# Patient Record
Sex: MALE | Race: WHITE | Employment: OTHER | URBAN - NONMETROPOLITAN AREA
[De-identification: names, ages, dates, MRNs, and addresses within clinical notes are randomized per-mention and may not be internally consistent; named-entity substitution may affect disease eponyms.]

---

## 2017-05-21 ENCOUNTER — APPOINTMENT (OUTPATIENT)
Dept: GENERAL RADIOLOGY | Age: 57
End: 2017-05-21
Payer: OTHER GOVERNMENT

## 2017-05-21 ENCOUNTER — HOSPITAL ENCOUNTER (EMERGENCY)
Age: 57
Discharge: HOME | End: 2017-05-21
Payer: OTHER GOVERNMENT

## 2017-05-21 VITALS
WEIGHT: 200 LBS | RESPIRATION RATE: 16 BRPM | BODY MASS INDEX: 28.63 KG/M2 | OXYGEN SATURATION: 98 % | HEIGHT: 70 IN | TEMPERATURE: 98.4 F | DIASTOLIC BLOOD PRESSURE: 85 MMHG | SYSTOLIC BLOOD PRESSURE: 155 MMHG | HEART RATE: 82 BPM

## 2017-05-21 DIAGNOSIS — S90.31XA CONTUSION OF RIGHT FOOT, INITIAL ENCOUNTER: Primary | ICD-10-CM

## 2017-05-21 PROCEDURE — 99282 EMERGENCY DEPT VISIT SF MDM: CPT | Performed by: NURSE PRACTITIONER

## 2017-05-21 PROCEDURE — 73630 X-RAY EXAM OF FOOT: CPT

## 2017-05-21 PROCEDURE — 99283 EMERGENCY DEPT VISIT LOW MDM: CPT

## 2017-05-21 RX ORDER — ACETAMINOPHEN, ASPIRIN AND CAFFEINE 250; 250; 65 MG/1; MG/1; MG/1
1 TABLET, FILM COATED ORAL EVERY 6 HOURS PRN
COMMUNITY
End: 2019-09-20 | Stop reason: CLARIF

## 2017-05-21 RX ORDER — OMEPRAZOLE 20 MG/1
20 CAPSULE, DELAYED RELEASE ORAL DAILY
COMMUNITY

## 2017-05-21 RX ORDER — HYDROCODONE BITARTRATE AND ACETAMINOPHEN 5; 325 MG/1; MG/1
1 TABLET ORAL EVERY 6 HOURS PRN
Qty: 10 TABLET | Refills: 0 | Status: SHIPPED | OUTPATIENT
Start: 2017-05-21 | End: 2018-09-17

## 2017-05-21 ASSESSMENT — PAIN DESCRIPTION - DESCRIPTORS: DESCRIPTORS: ACHING

## 2017-05-21 ASSESSMENT — PAIN SCALES - GENERAL: PAINLEVEL_OUTOF10: 2

## 2017-05-21 ASSESSMENT — PAIN DESCRIPTION - PAIN TYPE: TYPE: ACUTE PAIN

## 2017-05-21 ASSESSMENT — PAIN DESCRIPTION - ORIENTATION: ORIENTATION: RIGHT

## 2017-05-21 ASSESSMENT — PAIN DESCRIPTION - LOCATION: LOCATION: FOOT

## 2018-09-17 ENCOUNTER — OFFICE VISIT (OUTPATIENT)
Dept: FAMILY MEDICINE CLINIC | Age: 58
End: 2018-09-17
Payer: OTHER GOVERNMENT

## 2018-09-17 VITALS
OXYGEN SATURATION: 96 % | DIASTOLIC BLOOD PRESSURE: 68 MMHG | SYSTOLIC BLOOD PRESSURE: 126 MMHG | HEART RATE: 62 BPM | HEIGHT: 70 IN | TEMPERATURE: 97.8 F | BODY MASS INDEX: 29.78 KG/M2 | WEIGHT: 208 LBS

## 2018-09-17 DIAGNOSIS — Z12.5 ENCOUNTER FOR PROSTATE CANCER SCREENING: ICD-10-CM

## 2018-09-17 DIAGNOSIS — K21.9 GASTROESOPHAGEAL REFLUX DISEASE WITHOUT ESOPHAGITIS: ICD-10-CM

## 2018-09-17 DIAGNOSIS — R53.83 OTHER FATIGUE: ICD-10-CM

## 2018-09-17 DIAGNOSIS — Z13.220 LIPID SCREENING: ICD-10-CM

## 2018-09-17 DIAGNOSIS — Z00.00 ANNUAL PHYSICAL EXAM: Primary | ICD-10-CM

## 2018-09-17 DIAGNOSIS — Z12.11 ENCOUNTER FOR SCREENING COLONOSCOPY: ICD-10-CM

## 2018-09-17 PROCEDURE — 99386 PREV VISIT NEW AGE 40-64: CPT | Performed by: FAMILY MEDICINE

## 2018-09-17 NOTE — PATIENT INSTRUCTIONS
What Everyone Should Know about Shingles Vaccine (Shingrix)    CONTACT YOUR INSURANCE TO SEE IF YOUR POLICY COVERS THIS VACCINE    One of the Recommended Vaccines  Shingles vaccination is the only way to protect against shingles and postherpetic neuralgia (PHN), the most common complication from shingles. CDC recommends that healthy adults 50 years and older get two doses of the shingles vaccine called Shingrix®,  by 2 to 6 months, to prevent shingles and the complications from the disease. Your doctor or pharmacist can give you Shingrix as a shot in your upper arm. Shingrix provides strong protection against shingles and PHN. Two doses of Shingrix is more than 90% effective at preventing shingles and PHN. Protection stays above 85% for at least the first four years after you get vaccinated. Shingrix is the preferred vaccine, over Zostavax®, a shingles vaccine in use since 2006. Who Should Get Shingrix? Healthy adults 50 years and older should get two doses of Shingrix,  by 2 to 6 months. You should get Shingrix even if in the past you   had shingles   received Zostavax   are not sure if you had chickenpox  Vaccine for Those 50 Years and Older  Shingrix reduces the risk of shingles and PHN by more than 90% in people 48 and older. CDC recommends the vaccine for healthy adults 48 and older. There is no maximum age for getting Shingrix. If you had shingles in the past, you can get Shingrix to help prevent future occurrences of the disease. There is no specific length of time that you need to wait after having shingles before you can receive Shingrix, but generally you should make sure the shingles rash has gone away before getting vaccinated. You can get Shingrix whether or not you remember having had chickenpox in the past. Studies show that more than 99% of Americans 40 years and older have had chickenpox, even if they dont remember having the disease.  Chickenpox and shingles are related because they are caused by the same virus (varicella zoster virus). After a person recovers from chickenpox, the virus stays dormant (inactive) in the body. It can reactivate years later and cause shingles. If you had Zostavax in the recent past, you should wait at least eight weeks before getting Shingrix. Talk to your healthcare provider to determine the best time to get Shingrix. Shingrix is available in [place] and pharmacies. If you have questions about Shingrix, talk with your healthcare provider. Who Should Not Get Shingrix? The side effects of the Shingrix are temporary, and usually last 2 to 3 days. While you may experience pain for a few days after getting Shingrix, the pain will be less severe than having shingles and the complications from the disease. You should not get Shingrix if you:   have ever had a severe allergic reaction to any component of the vaccine or after a dose of Shingrix   tested negative for immunity to varicella zoster virus. If you test negative, you should get chickenpox vaccine.  currently have shingles   currently are pregnant or breastfeeding. Women who are pregnant or breastfeeding should wait to get Shingrix. If you have a minor acute (starts suddenly) illness, such as a cold, you may get Shingrix. But if you have a moderate or severe acute illness, you should usually wait until you recover before getting the vaccine. This includes anyone with a temperature of 101.3°F or higher. How Well Does Shingrix Work? Two doses of Shingrix provides strong protection against shingles and postherpetic neuralgia (PHN), the most common complication of shingles.  In adults 48to 71years old who got two doses, Shingrix was 97% effective in preventing shingles; among adults 70 years and older, Shingrix was 91% effective.    In adults 48to 71years old who got two doses, Shingrix was 91% effective in preventing PHN; among adults 70 years and older, Shingrix was 89% effective. Shingrix protection remained high (more than 85%) in people 70 years and older throughout the four years following vaccination. Since your risk of shingles and PHN increases as you get older, it is important to have strong protection against shingles in your older years. What are the possible side effects of Shingrix? Studies show that Shingrix is safe. The vaccine helps your body create a strong defense against shingles. As a result, you are likely to have temporary side effects from getting the shots. The side effects may affect your ability to do normal daily activities for 2 to 3 days. Most people got a sore arm with mild or moderate pain after getting Shingrix, and some also had redness and swelling where they got the shot. Some people felt tired, had muscle pain, a headache, shivering, fever, stomach pain, or nausea. About 1 out of 6 people who got Shingrix experienced side effects that prevented them from doing regular activities. Symptoms went away on their own in about 2 to 3 days. Side effects were more common in younger people. You might have a reaction to the first or second dose of Shingrix, or both doses. If you experience side effects, you may choose to take over-the-counter pain medicine such as ibuprofen or acetaminophen. Severe allergic reactions to any vaccine are very rare. Signs of a severe allergic reaction can include hives, swelling of the face and throat, difficulty breathing, a fast heartbeat, dizziness, and weakness. These would start a few minutes to a few hours after the vaccination. If you have a severe allergic reaction or other emergency that cant wait, call 9-1-1 or go to the nearest hospital. Otherwise, call your doctor. If you experience side effects from Shingrix, you should report them to the Vaccine Adverse Event Reporting System (VAERS).  Your doctor might file this report, or you can do it yourself through the VAERS website, or by calling

## 2018-09-19 DIAGNOSIS — K21.9 GASTROESOPHAGEAL REFLUX DISEASE WITHOUT ESOPHAGITIS: ICD-10-CM

## 2018-09-19 DIAGNOSIS — Z13.220 LIPID SCREENING: ICD-10-CM

## 2018-09-19 DIAGNOSIS — R53.83 OTHER FATIGUE: ICD-10-CM

## 2018-09-19 DIAGNOSIS — Z12.5 ENCOUNTER FOR PROSTATE CANCER SCREENING: ICD-10-CM

## 2018-09-19 LAB
ALBUMIN SERPL-MCNC: 4.3 G/DL (ref 3.5–5.2)
ALP BLD-CCNC: 47 U/L (ref 40–130)
ALT SERPL-CCNC: 42 U/L (ref 5–41)
ANION GAP SERPL CALCULATED.3IONS-SCNC: 12 MMOL/L (ref 7–19)
AST SERPL-CCNC: 30 U/L (ref 5–40)
BASOPHILS ABSOLUTE: 0 K/UL (ref 0–0.2)
BASOPHILS RELATIVE PERCENT: 0.4 % (ref 0–1)
BILIRUB SERPL-MCNC: 0.5 MG/DL (ref 0.2–1.2)
BUN BLDV-MCNC: 21 MG/DL (ref 6–20)
CALCIUM SERPL-MCNC: 9.3 MG/DL (ref 8.6–10)
CHLORIDE BLD-SCNC: 103 MMOL/L (ref 98–111)
CHOLESTEROL, TOTAL: 238 MG/DL (ref 160–199)
CO2: 27 MMOL/L (ref 22–29)
CREAT SERPL-MCNC: 1 MG/DL (ref 0.5–1.2)
EOSINOPHILS ABSOLUTE: 0.1 K/UL (ref 0–0.6)
EOSINOPHILS RELATIVE PERCENT: 1.2 % (ref 0–5)
GFR NON-AFRICAN AMERICAN: >60
GLUCOSE BLD-MCNC: 105 MG/DL (ref 74–109)
HCT VFR BLD CALC: 45 % (ref 42–52)
HDLC SERPL-MCNC: 37 MG/DL (ref 55–121)
HEMOGLOBIN: 14.8 G/DL (ref 14–18)
LDL CHOLESTEROL CALCULATED: 170 MG/DL
LYMPHOCYTES ABSOLUTE: 2.1 K/UL (ref 1.1–4.5)
LYMPHOCYTES RELATIVE PERCENT: 43.9 % (ref 20–40)
MCH RBC QN AUTO: 29.5 PG (ref 27–31)
MCHC RBC AUTO-ENTMCNC: 32.9 G/DL (ref 33–37)
MCV RBC AUTO: 89.8 FL (ref 80–94)
MONOCYTES ABSOLUTE: 0.4 K/UL (ref 0–0.9)
MONOCYTES RELATIVE PERCENT: 7.6 % (ref 0–10)
NEUTROPHILS ABSOLUTE: 2.3 K/UL (ref 1.5–7.5)
NEUTROPHILS RELATIVE PERCENT: 46.7 % (ref 50–65)
PDW BLD-RTO: 12.4 % (ref 11.5–14.5)
PLATELET # BLD: 204 K/UL (ref 130–400)
PMV BLD AUTO: 10.7 FL (ref 9.4–12.4)
POTASSIUM SERPL-SCNC: 4.3 MMOL/L (ref 3.5–5)
PROSTATE SPECIFIC ANTIGEN: 0.47 NG/ML (ref 0–4)
RBC # BLD: 5.01 M/UL (ref 4.7–6.1)
SODIUM BLD-SCNC: 142 MMOL/L (ref 136–145)
T4 FREE: 1.1 NG/DL (ref 0.9–1.7)
TESTOSTERONE TOTAL: 582 NG/DL (ref 193–740)
TOTAL PROTEIN: 7.2 G/DL (ref 6.6–8.7)
TRIGL SERPL-MCNC: 155 MG/DL (ref 0–149)
TSH SERPL DL<=0.05 MIU/L-ACNC: 2.25 UIU/ML (ref 0.27–4.2)
WBC # BLD: 4.9 K/UL (ref 4.8–10.8)

## 2018-10-05 ENCOUNTER — HOSPITAL ENCOUNTER (OUTPATIENT)
Dept: GENERAL RADIOLOGY | Age: 58
Discharge: HOME | End: 2018-10-05
Payer: OTHER GOVERNMENT

## 2018-10-05 ENCOUNTER — OFFICE VISIT (OUTPATIENT)
Dept: FAMILY MEDICINE CLINIC | Age: 58
End: 2018-10-05
Payer: OTHER GOVERNMENT

## 2018-10-05 VITALS
HEART RATE: 92 BPM | BODY MASS INDEX: 29.66 KG/M2 | SYSTOLIC BLOOD PRESSURE: 148 MMHG | DIASTOLIC BLOOD PRESSURE: 82 MMHG | TEMPERATURE: 97.3 F | OXYGEN SATURATION: 96 % | HEIGHT: 70 IN | WEIGHT: 207.2 LBS

## 2018-10-05 DIAGNOSIS — M25.562 ACUTE PAIN OF LEFT KNEE: Primary | ICD-10-CM

## 2018-10-05 DIAGNOSIS — M25.562 ACUTE PAIN OF LEFT KNEE: ICD-10-CM

## 2018-10-05 PROCEDURE — 99213 OFFICE O/P EST LOW 20 MIN: CPT | Performed by: NURSE PRACTITIONER

## 2018-10-05 PROCEDURE — 73560 X-RAY EXAM OF KNEE 1 OR 2: CPT

## 2018-10-05 RX ORDER — NAPROXEN 500 MG/1
500 TABLET ORAL 2 TIMES DAILY PRN
Qty: 30 TABLET | Refills: 0 | Status: SHIPPED | OUTPATIENT
Start: 2018-10-05

## 2018-10-05 ASSESSMENT — ENCOUNTER SYMPTOMS
CHEST TIGHTNESS: 0
NAUSEA: 0
DIARRHEA: 0
SHORTNESS OF BREATH: 0
WHEEZING: 0
COUGH: 0
ABDOMINAL PAIN: 0
SORE THROAT: 0

## 2018-10-05 ASSESSMENT — PATIENT HEALTH QUESTIONNAIRE - PHQ9
SUM OF ALL RESPONSES TO PHQ QUESTIONS 1-9: 0
SUM OF ALL RESPONSES TO PHQ9 QUESTIONS 1 & 2: 0
1. LITTLE INTEREST OR PLEASURE IN DOING THINGS: 0
SUM OF ALL RESPONSES TO PHQ QUESTIONS 1-9: 0
2. FEELING DOWN, DEPRESSED OR HOPELESS: 0

## 2018-10-05 NOTE — PROGRESS NOTES
Sarina Tejada is a 61 y.o. male who presents today for   Chief Complaint   Patient presents with    Knee Pain     left knee-pt c/o pain lasting for about a week        HPI:  He has had L knee pain for the past week. He thinks he may have run into the bedpost during the night but had no significant pain initially. No injury otherwise. He played basketball twice this week which exacerbated the pain. Pain is mainly patellar, particularly subpatellar. He has had mild swelling. Pain is worse when bearing weight, walking. He is using crutches today. He was wearing a brace but seemed to make it worse. He is taking excedrin for pain. He tried an old hydrocodone he had from a previous rx but caused nausea. Pain is slightly better today. Review of Systems   Constitutional: Negative for chills and fever. HENT: Negative for congestion, ear pain and sore throat. Respiratory: Negative for cough, chest tightness, shortness of breath and wheezing. Cardiovascular: Negative for chest pain. Gastrointestinal: Negative for abdominal pain, diarrhea and nausea. Musculoskeletal: Positive for arthralgias (left knee pain). Negative for myalgias. Skin: Negative for rash. Past Medical History:   Diagnosis Date    Head injury 1975    Headache        Current Outpatient Prescriptions   Medication Sig Dispense Refill    naproxen (NAPROSYN) 500 MG tablet Take 1 tablet by mouth 2 times daily as needed for Pain Take with food. 30 tablet 0    Multiple Vitamin (MULTI-VITAMIN DAILY PO) Take by mouth      aspirin-acetaminophen-caffeine (EXCEDRIN MIGRAINE) 250-250-65 MG per tablet Take 1 tablet by mouth every 6 hours as needed for Headaches      omeprazole (PRILOSEC) 20 MG delayed release capsule Take 20 mg by mouth daily       No current facility-administered medications for this visit.         Allergies   Allergen Reactions    Hydrocodone Nausea Only       Past Surgical History:   Procedure Laterality Date

## 2019-09-20 ENCOUNTER — OFFICE VISIT (OUTPATIENT)
Dept: FAMILY MEDICINE CLINIC | Age: 59
End: 2019-09-20
Payer: OTHER GOVERNMENT

## 2019-09-20 VITALS
WEIGHT: 208 LBS | DIASTOLIC BLOOD PRESSURE: 78 MMHG | BODY MASS INDEX: 29.84 KG/M2 | TEMPERATURE: 97.8 F | SYSTOLIC BLOOD PRESSURE: 132 MMHG | HEART RATE: 54 BPM | OXYGEN SATURATION: 98 %

## 2019-09-20 DIAGNOSIS — R06.02 SHORTNESS OF BREATH: ICD-10-CM

## 2019-09-20 DIAGNOSIS — Z12.5 ENCOUNTER FOR PROSTATE CANCER SCREENING: ICD-10-CM

## 2019-09-20 DIAGNOSIS — R94.31 ABNORMAL EKG: ICD-10-CM

## 2019-09-20 DIAGNOSIS — I45.10 INCOMPLETE RBBB: ICD-10-CM

## 2019-09-20 DIAGNOSIS — Z13.220 LIPID SCREENING: ICD-10-CM

## 2019-09-20 DIAGNOSIS — Z23 NEED FOR INFLUENZA VACCINATION: ICD-10-CM

## 2019-09-20 DIAGNOSIS — R53.83 OTHER FATIGUE: ICD-10-CM

## 2019-09-20 DIAGNOSIS — Z00.00 ANNUAL PHYSICAL EXAM: Primary | ICD-10-CM

## 2019-09-20 PROCEDURE — 90686 IIV4 VACC NO PRSV 0.5 ML IM: CPT | Performed by: FAMILY MEDICINE

## 2019-09-20 PROCEDURE — 90471 IMMUNIZATION ADMIN: CPT | Performed by: FAMILY MEDICINE

## 2019-09-20 PROCEDURE — 99386 PREV VISIT NEW AGE 40-64: CPT | Performed by: FAMILY MEDICINE

## 2019-09-20 PROCEDURE — 93000 ELECTROCARDIOGRAM COMPLETE: CPT | Performed by: FAMILY MEDICINE

## 2019-09-20 RX ORDER — MULTIVITAMIN WITH IRON
250 TABLET ORAL DAILY
COMMUNITY

## 2019-09-20 RX ORDER — ASCORBIC ACID 1000 MG
TABLET ORAL
COMMUNITY

## 2019-09-20 RX ORDER — CHOLECALCIFEROL (VITAMIN D3) 25 MCG
TABLET ORAL
COMMUNITY

## 2019-09-20 RX ORDER — LANOLIN ALCOHOL/MO/W.PET/CERES
1000 CREAM (GRAM) TOPICAL DAILY
COMMUNITY

## 2019-09-20 RX ORDER — ACETAMINOPHEN, ASPIRIN AND CAFFEINE 250; 250; 65 MG/1; MG/1; MG/1
TABLET, FILM COATED ORAL
COMMUNITY

## 2019-09-20 ASSESSMENT — ENCOUNTER SYMPTOMS
DIARRHEA: 0
COUGH: 0
NAUSEA: 0
ANAL BLEEDING: 0
CHEST TIGHTNESS: 0
ABDOMINAL PAIN: 0
CONSTIPATION: 0
SHORTNESS OF BREATH: 1

## 2019-09-20 ASSESSMENT — PATIENT HEALTH QUESTIONNAIRE - PHQ9
1. LITTLE INTEREST OR PLEASURE IN DOING THINGS: 0
SUM OF ALL RESPONSES TO PHQ9 QUESTIONS 1 & 2: 0
SUM OF ALL RESPONSES TO PHQ QUESTIONS 1-9: 0
2. FEELING DOWN, DEPRESSED OR HOPELESS: 0
SUM OF ALL RESPONSES TO PHQ QUESTIONS 1-9: 0

## 2019-09-20 NOTE — PROGRESS NOTES
[facility] [address] [place] [address]  Dept: [phone]  Dept Fax: [phone]  Loc: [phone]    Odilia Stephens is a 60 y.o. male who presents today for his medical conditions/complaints as noted below. Odilia Stephens is here for Annual Exam and Shoulder Pain (left side)        HPI:   CC: Here today to discuss the following:    He remains very active and goes to the gym most days of the week. He has, however, noticed some decreased stamina with the past few months. He is also had some associated shortness of breath. He denies chest pain or palpitations however. He feels the symptoms have been progressively worsening since last year.         HPI    Past Medical History:   Diagnosis Date    Head injury     Headache       Past Surgical History:   Procedure Laterality Date    HERNIA REPAIR         Family History   Problem Relation Age of Onset    Cancer Brother     Diabetes Brother         type 2       Social History     Tobacco Use    Smoking status: Former Smoker     Packs/day: 3.00     Years: 26.00     Pack years: 78.00     Types: Cigarettes     Start date: 1966     Last attempt to quit: 1990     Years since quittin.7    Smokeless tobacco: Former User   Substance Use Topics    Alcohol use: Yes     Comment: daily     Current Outpatient Medications   Medication Sig Dispense Refill    aspirin-acetaminophen-caffeine (EXCEDRIN EXTRA STRENGTH) 250-250-65 MG per tablet Excedrin Extra Strength      vitamin B-12 (CYANOCOBALAMIN) 1000 MCG tablet Take 1,000 mcg by mouth daily      Cholecalciferol (VITAMIN D3) 25 MCG TABS Take by mouth      magnesium (MAGNESIUM-OXIDE) 250 MG TABS tablet Take 250 mg by mouth daily      Ginkgo Biloba 40 MG TABS Take by mouth      Glucosamine-MSM-Hyaluronic Acd (JOINT HEALTH PO) Take by mouth      omeprazole (PRILOSEC) 20 MG delayed release capsule Take 20 mg by mouth daily      naproxen (NAPROSYN) 500 MG tablet Take 1 tablet by mouth 2 times daily as needed for Pain Take with food. (Patient not taking: Reported on 9/20/2019) 30 tablet 0    Multiple Vitamin (MULTI-VITAMIN DAILY PO) Take by mouth       No current facility-administered medications for this visit. Allergies   Allergen Reactions    Hydrocodone Nausea Only       Health Maintenance   Topic Date Due    Hepatitis C screen  1960    HIV screen  10/12/1974    DTaP/Tdap/Td vaccine (1 - Tdap) 10/12/1978    Diabetes screen  10/12/1999    Shingles Vaccine (1 of 2) 10/12/2009    Colon cancer screen colonoscopy  10/12/2009    Flu vaccine (1) 09/01/2019    Lipid screen  09/19/2023    Pneumococcal 0-64 years Vaccine  Aged Out       Subjective:      Review of Systems   Constitutional: Negative for chills and fever. HENT: Negative for congestion. Respiratory: Positive for shortness of breath. Negative for cough and chest tightness. Cardiovascular: Negative for chest pain, palpitations and leg swelling. Gastrointestinal: Negative for abdominal pain, anal bleeding, constipation, diarrhea and nausea. Genitourinary: Negative for difficulty urinating. Psychiatric/Behavioral: Negative. SeeHPI for visit specific review of symptoms. All others negative      Objective:   /78   Pulse 54   Temp 97.8 °F (36.6 °C)   Wt 208 lb (94.3 kg)   SpO2 98%   BMI 29.84 kg/m²   Physical Exam   Constitutional: He appears well-developed. He does not appear ill. Eyes: Pupils are equal, round, and reactive to light. Neck: Normal range of motion. Neck supple. Cardiovascular: Normal rate and regular rhythm. Exam reveals no friction rub. No murmur heard. Pulmonary/Chest: Effort normal and breath sounds normal. No respiratory distress. He has no wheezes. He has no rales. Abdominal: Soft. Bowel sounds are normal. He exhibits no distension. There is no tenderness. There is no rebound and no guarding.

## 2019-09-24 DIAGNOSIS — Z13.220 LIPID SCREENING: ICD-10-CM

## 2019-09-24 DIAGNOSIS — R53.83 OTHER FATIGUE: ICD-10-CM

## 2019-09-24 DIAGNOSIS — Z12.5 ENCOUNTER FOR PROSTATE CANCER SCREENING: ICD-10-CM

## 2019-09-24 LAB
ALBUMIN SERPL-MCNC: 4.5 G/DL (ref 3.5–5.2)
ALP BLD-CCNC: 57 U/L (ref 40–130)
ALT SERPL-CCNC: 43 U/L (ref 5–41)
ANION GAP SERPL CALCULATED.3IONS-SCNC: 13 MMOL/L (ref 7–19)
AST SERPL-CCNC: 27 U/L (ref 5–40)
BASOPHILS ABSOLUTE: 0 K/UL (ref 0–0.2)
BASOPHILS RELATIVE PERCENT: 0.7 % (ref 0–1)
BILIRUB SERPL-MCNC: 0.3 MG/DL (ref 0.2–1.2)
BUN BLDV-MCNC: 20 MG/DL (ref 6–20)
CALCIUM SERPL-MCNC: 9.2 MG/DL (ref 8.6–10)
CHLORIDE BLD-SCNC: 103 MMOL/L (ref 98–111)
CHOLESTEROL, TOTAL: 238 MG/DL (ref 160–199)
CO2: 28 MMOL/L (ref 22–29)
CREAT SERPL-MCNC: 1.1 MG/DL (ref 0.5–1.2)
EOSINOPHILS ABSOLUTE: 0.1 K/UL (ref 0–0.6)
EOSINOPHILS RELATIVE PERCENT: 0.9 % (ref 0–5)
FERRITIN: 237.3 NG/ML (ref 30–400)
FOLATE: 16.4 NG/ML (ref 4.5–32.2)
GFR NON-AFRICAN AMERICAN: >60
GLUCOSE BLD-MCNC: 113 MG/DL (ref 74–109)
HCT VFR BLD CALC: 46.2 % (ref 42–52)
HDLC SERPL-MCNC: 36 MG/DL (ref 55–121)
HEMOGLOBIN: 15.1 G/DL (ref 14–18)
IMMATURE GRANULOCYTES #: 0 K/UL
LDL CHOLESTEROL CALCULATED: 171 MG/DL
LYMPHOCYTES ABSOLUTE: 2.3 K/UL (ref 1.1–4.5)
LYMPHOCYTES RELATIVE PERCENT: 42.5 % (ref 20–40)
MCH RBC QN AUTO: 29.8 PG (ref 27–31)
MCHC RBC AUTO-ENTMCNC: 32.7 G/DL (ref 33–37)
MCV RBC AUTO: 91.1 FL (ref 80–94)
MONOCYTES ABSOLUTE: 0.4 K/UL (ref 0–0.9)
MONOCYTES RELATIVE PERCENT: 7.8 % (ref 0–10)
NEUTROPHILS ABSOLUTE: 2.6 K/UL (ref 1.5–7.5)
NEUTROPHILS RELATIVE PERCENT: 47.6 % (ref 50–65)
PDW BLD-RTO: 12.5 % (ref 11.5–14.5)
PLATELET # BLD: 198 K/UL (ref 130–400)
PMV BLD AUTO: 10.4 FL (ref 9.4–12.4)
POTASSIUM SERPL-SCNC: 4.3 MMOL/L (ref 3.5–5)
PROSTATE SPECIFIC ANTIGEN: 0.61 NG/ML (ref 0–4)
RBC # BLD: 5.07 M/UL (ref 4.7–6.1)
SODIUM BLD-SCNC: 144 MMOL/L (ref 136–145)
T4 FREE: 1.1 NG/DL (ref 0.9–1.7)
TESTOSTERONE TOTAL: 609.8 NG/DL (ref 193–740)
TOTAL PROTEIN: 7.7 G/DL (ref 6.6–8.7)
TRIGL SERPL-MCNC: 157 MG/DL (ref 0–149)
TSH SERPL DL<=0.05 MIU/L-ACNC: 1.88 UIU/ML (ref 0.27–4.2)
VITAMIN B-12: 786 PG/ML (ref 211–946)
WBC # BLD: 5.5 K/UL (ref 4.8–10.8)

## 2019-10-10 ENCOUNTER — HOSPITAL ENCOUNTER (OUTPATIENT)
Dept: NON INVASIVE DIAGNOSTICS | Age: 59
Discharge: HOME | End: 2019-10-10
Payer: OTHER GOVERNMENT

## 2019-10-10 DIAGNOSIS — I45.10 INCOMPLETE RBBB: ICD-10-CM

## 2019-10-10 DIAGNOSIS — R06.02 SHORTNESS OF BREATH: ICD-10-CM

## 2019-10-10 LAB
LV EF: 50 %
LVEF MODALITY: NORMAL

## 2019-10-10 PROCEDURE — 93350 STRESS TTE ONLY: CPT

## 2020-11-18 ENCOUNTER — TELEPHONE (OUTPATIENT)
Dept: FAMILY MEDICINE CLINIC | Age: 60
End: 2020-11-18

## 2020-11-18 ENCOUNTER — OFFICE VISIT (OUTPATIENT)
Age: 60
End: 2020-11-18

## 2020-11-18 VITALS — OXYGEN SATURATION: 97 % | TEMPERATURE: 96.8 F | HEART RATE: 68 BPM

## 2020-11-18 PROCEDURE — 99999 PR OFFICE/OUTPT VISIT,PROCEDURE ONLY: CPT | Performed by: NURSE PRACTITIONER

## 2020-11-21 LAB — SARS-COV-2, NAA: NOT DETECTED

## 2021-02-01 ENCOUNTER — OFFICE VISIT (OUTPATIENT)
Dept: URGENT CARE | Age: 61
End: 2021-02-01
Payer: OTHER GOVERNMENT

## 2021-02-01 ENCOUNTER — OFFICE VISIT (OUTPATIENT)
Age: 61
End: 2021-02-01

## 2021-02-01 VITALS
RESPIRATION RATE: 18 BRPM | DIASTOLIC BLOOD PRESSURE: 82 MMHG | TEMPERATURE: 98.2 F | WEIGHT: 210 LBS | HEART RATE: 72 BPM | SYSTOLIC BLOOD PRESSURE: 150 MMHG | HEIGHT: 70 IN | BODY MASS INDEX: 30.06 KG/M2 | OXYGEN SATURATION: 98 %

## 2021-02-01 DIAGNOSIS — Z11.59 SCREENING FOR VIRAL DISEASE: Primary | ICD-10-CM

## 2021-02-01 DIAGNOSIS — R09.81 NASAL CONGESTION: ICD-10-CM

## 2021-02-01 DIAGNOSIS — R05.8 COUGH WITH EXPOSURE TO COVID-19 VIRUS: ICD-10-CM

## 2021-02-01 DIAGNOSIS — Z20.822 COUGH WITH EXPOSURE TO COVID-19 VIRUS: ICD-10-CM

## 2021-02-01 DIAGNOSIS — R11.0 NAUSEA: Primary | ICD-10-CM

## 2021-02-01 PROCEDURE — 99213 OFFICE O/P EST LOW 20 MIN: CPT | Performed by: NURSE PRACTITIONER

## 2021-02-01 PROCEDURE — 99999 PR OFFICE/OUTPT VISIT,PROCEDURE ONLY: CPT | Performed by: NURSE PRACTITIONER

## 2021-02-01 ASSESSMENT — ENCOUNTER SYMPTOMS
RHINORRHEA: 1
COUGH: 1
SHORTNESS OF BREATH: 0
SINUS PRESSURE: 0
NAUSEA: 1
SORE THROAT: 0
ABDOMINAL PAIN: 0
DIARRHEA: 0
SINUS PAIN: 0
VOMITING: 0
ALLERGIC/IMMUNOLOGIC NEGATIVE: 1

## 2021-02-01 ASSESSMENT — VISUAL ACUITY: OU: 1

## 2021-02-01 NOTE — PROGRESS NOTES
Patient swabbed for COVID-19 using [employer] vendor. Patient specimen collected in drive through in patients private vehicle due to order present by flu clinic provider.

## 2021-02-01 NOTE — PATIENT INSTRUCTIONS
Plenty of fluids  Rest  OTC Tylenol or Motrin as needed  Stay home and stay in until we call with results  Follow up with PCP or return to Urgent Care for worsening or unresolved symptoms. Patient Education        Learning About Coronavirus [phone])  What is coronavirus (COVID-19)? COVID-19 is a disease caused by a new type of coronavirus. This illness was first found in December 2019. It has since spread worldwide. Coronaviruses are a large group of viruses. They cause the common cold. They also cause more serious illnesses like [place] respiratory syndrome (MERS) and severe acute respiratory syndrome (SARS). COVID-19 is caused by a novel coronavirus. That means it's a new type that has not been seen in people before. What are the symptoms? Coronavirus (COVID-19) symptoms may include:  · Fever. · Cough. · Trouble breathing. · Chills or repeated shaking with chills. · Muscle pain. · Headache. · Sore throat. · New loss of taste or smell. · Vomiting. · Diarrhea. In severe cases, COVID-19 can cause pneumonia and make it hard to breathe without help from a machine. It can cause death. How is it diagnosed? COVID-19 is diagnosed with a viral test. This may also be called a PCR test or antigen test. It looks for evidence of the virus in your breathing passages or lungs (respiratory system). The test is most often done on a sample from the nose, throat, or lungs. It's sometimes done on a sample of saliva. One way a sample is collected is by putting a long swab into the back of your nose. How is it treated? Mild cases of COVID-19 can be treated at home. Serious cases need treatment in the hospital. Treatment may include medicines to reduce symptoms, plus breathing support such as oxygen therapy or a ventilator. Some people may be placed on their belly to help their oxygen levels. Treatments that may help people who have COVID-19 include:  Antiviral medicines. These medicines treat viral infections. Remdesivir is an example. Immune-based therapy. These medicines help the immune system fight COVID-19. One example is bamlanivimab. It's a monoclonal antibody. Blood thinners. These medicines help prevent blood clots. People with severe illness are at risk for blood clots. How can you protect yourself and others? The best way to protect yourself from getting sick is to:  · Avoid areas where there is an outbreak. · Avoid contact with people who may be infected. · Avoid crowds and try to stay at least 6 feet away from other people. · Wash your hands often, especially after you cough or sneeze. Use soap and water, and scrub for at least 20 seconds. If soap and water aren't available, use an alcohol-based hand . · Avoid touching your mouth, nose, and eyes. To help avoid spreading the virus to others:  · Stay home if you are sick or have been exposed to the virus. Don't go to school, work, or public areas. And don't use public transportation, ride-shares, or taxis unless you have no choice. · Wear a cloth face cover if you have to go to public areas. · Cover your mouth with a tissue when you cough or sneeze. Then throw the tissue in the trash and wash your hands right away. · If you're sick:  ? Leave your home only if you need to get medical care. But call the doctor's office first so they know you're coming. And wear a face cover. ? Wear the face cover whenever you're around other people. It can help stop the spread of the virus when you cough or sneeze. ? Limit contact with pets and people in your home. If possible, stay in a separate bedroom and use a separate bathroom. ? Clean and disinfect your home every day. Use household  and disinfectant wipes or sprays. Take special care to clean things that you grab with your hands. These include doorknobs, remote controls, phones, and handles on your refrigerator and microwave. And don't forget countertops, tabletops, bathrooms, and computer keyboards. When should you call for help? Call 911 anytime you think you may need emergency care. For example, call if you have life-threatening symptoms, such as:    · You have severe trouble breathing. (You can't talk at all.)     · You have constant chest pain or pressure.     · You are severely dizzy or lightheaded.     · You are confused or can't think clearly.     · Your face and lips have a blue color.     · You pass out (lose consciousness) or are very hard to wake up. Call your doctor now or seek immediate medical care if:    · You have moderate trouble breathing. (You can't speak a full sentence.)     · You are coughing up blood (more than about 1 teaspoon).     · You have signs of low blood pressure. These include feeling lightheaded; being too weak to stand; and having cold, pale, clammy skin. Watch closely for changes in your health, and be sure to contact your doctor if:    · Your symptoms get worse.     · You are not getting better as expected. Call before you go to the doctor's office. Follow their instructions. And wear a cloth face cover. Current as of: December 18, 2020               Content Version: 12.7  © 2006-2021 [employer]. Care instructions adapted under license by [facility] ([facility]). If you have questions about a medical condition or this instruction, always ask your healthcare professional. [place] [address] any warranty or liability for your use of this information.        Patient Education        Coronavirus ([id number]): Care Instructions  Overview The coronavirus disease (COVID-19) is caused by a virus. Symptoms may include a fever, a cough, and shortness of breath. It mainly spreads person-to-person through droplets from coughing and sneezing. The virus also can spread when people are in close contact with someone who is infected. Most people have mild symptoms and can take care of themselves at home. If their symptoms get worse, they may need care in a hospital. Treatment may include medicines to reduce symptoms, plus breathing support such as oxygen therapy or a ventilator. It's important to not spread the virus to others. If you have COVID-19, wear a face cover anytime you are around other people. You need to isolate yourself while you are sick. Leave your home only if you need to get medical care or testing. Follow-up care is a key part of your treatment and safety. Be sure to make and go to all appointments, and call your doctor if you are having problems. It's also a good idea to know your test results and keep a list of the medicines you take. How can you care for yourself at home? · Get extra rest. It can help you feel better. · Drink plenty of fluids. This helps replace fluids lost from fever. Fluids also help ease a scratchy throat. Water, soup, fruit juice, and hot tea with lemon are good choices. · Take acetaminophen (such as Tylenol) to reduce a fever. It may also help with muscle aches. Read and follow all instructions on the label. · Use petroleum jelly on sore skin. This can help if the skin around your nose and lips becomes sore from rubbing a lot with tissues. Tips for self-isolation  · Limit contact with people in your home. If possible, stay in a separate bedroom and use a separate bathroom. · Wear a cloth face cover when you are around other people. It can help stop the spread of the virus when you cough or sneeze. · If you have to leave home, avoid crowds and try to stay at least 6 feet away from other people. · Avoid contact with pets and other animals. · Cover your mouth and nose with a tissue when you cough or sneeze. Then throw it in the trash right away. · Wash your hands often, especially after you cough or sneeze. Use soap and water, and scrub for at least 20 seconds. If soap and water aren't available, use an alcohol-based hand . · Don't share personal household items. These include bedding, towels, cups and glasses, and eating utensils. · [address] in the warmest water allowed for the fabric type, and dry it completely. It's okay to wash other people's laundry with yours. · Clean and disinfect your home every day. Use household  and disinfectant wipes or sprays. Take special care to clean things that you grab with your hands. These include doorknobs, remote controls, phones, and handles on your refrigerator and microwave. And don't forget countertops, tabletops, bathrooms, and computer keyboards. When you can end self-isolation  · If you know or suspect that you have COVID-19, stay in self-isolation until:  ? You haven't had a fever for 24 hours while not taking medicines to lower the fever, and  ? Your symptoms have improved, and  ? It's been at least 10 days since your symptoms started. · Talk to your doctor about whether you also need testing, especially if you have a weakened immune system. When should you call for help? Call 911 anytime you think you may need emergency care. For example, call if you have life-threatening symptoms, such as:    · You have severe trouble breathing. (You can't talk at all.)     · You have constant chest pain or pressure.     · You are severely dizzy or lightheaded.     · You are confused or can't think clearly.     · Your face and lips have a blue color.     · You pass out (lose consciousness) or are very hard to wake up.    Call your doctor now or seek immediate medical care if:   · You have moderate trouble breathing. (You can't speak a full sentence.)     · You are coughing up blood (more than about 1 teaspoon).     · You have signs of low blood pressure. These include feeling lightheaded; being too weak to stand; and having cold, pale, clammy skin. Watch closely for changes in your health, and be sure to contact your doctor if:    · Your symptoms get worse.     · You are not getting better as expected. Call before you go to the doctor's office. Follow their instructions. And wear a cloth face cover. Current as of: December 18, 2020               Content Version: 12.7  © 2006-2021 [employer], [employer]. Care instructions adapted under license by [facility] ([facility]). If you have questions about a medical condition or this instruction, always ask your healthcare professional. [address] any warranty or liability for your use of this information.

## 2021-02-01 NOTE — PROGRESS NOTES
 Cholecalciferol (VITAMIN D3) 25 MCG TABS Take by mouth      magnesium (MAGNESIUM-OXIDE) 250 MG TABS tablet Take 250 mg by mouth daily      Ginkgo Biloba 40 MG TABS Take by mouth      Glucosamine-MSM-Hyaluronic Acd (JOINT HEALTH PO) Take by mouth      naproxen (NAPROSYN) 500 MG tablet Take 1 tablet by mouth 2 times daily as needed for Pain Take with food. 30 tablet 0    Multiple Vitamin (MULTI-VITAMIN DAILY PO) Take by mouth      omeprazole (PRILOSEC) 20 MG delayed release capsule Take 20 mg by mouth daily       No current facility-administered medications for this visit. Allergies   Allergen Reactions    Hydrocodone Nausea Only       Health Maintenance   Topic Date Due    Hepatitis C screen  1960    HIV screen  10/12/1974    DTaP/Tdap/Td vaccine (1 - Tdap) 10/12/1978    Diabetes screen  10/12/1999    Shingles Vaccine (1 of 2) 10/12/2009    Colon cancer screen colonoscopy  10/12/2009    Flu vaccine (1) 09/01/2020    Lipid screen  09/24/2024    Hepatitis A vaccine  Aged Out    Hepatitis B vaccine  Aged Out    Hib vaccine  Aged Out    Meningococcal (ACWY) vaccine  Aged Out    Pneumococcal 0-64 years Vaccine  Aged Out       Subjective:     Review of Systems   Constitutional: Positive for appetite change and fatigue. Negative for activity change, chills and fever. HENT: Positive for congestion and rhinorrhea. Negative for ear discharge, sinus pressure, sinus pain and sore throat. Respiratory: Positive for cough. Negative for shortness of breath. Cardiovascular: Negative. Gastrointestinal: Positive for anorexia and nausea. Negative for abdominal pain, diarrhea and vomiting. Musculoskeletal: Negative for arthralgias and myalgias. Skin: Negative for rash. Allergic/Immunologic: Negative.        :Objective      Physical Exam  Vitals signs and nursing note reviewed. Constitutional:       General: He is awake. He is not in acute distress. Appearance: Normal appearance. He is well-developed, well-groomed and overweight. He is not ill-appearing or toxic-appearing. HENT:      Head: Normocephalic. Right Ear: Hearing, tympanic membrane, ear canal and external ear normal.      Left Ear: Hearing, tympanic membrane, ear canal and external ear normal.      Nose: Congestion present. Right Sinus: No maxillary sinus tenderness or frontal sinus tenderness. Left Sinus: No maxillary sinus tenderness or frontal sinus tenderness. Mouth/Throat:      Lips: Pink. Mouth: Mucous membranes are moist.      Pharynx: Oropharynx is clear. Uvula midline. Posterior oropharyngeal erythema present. Tonsils: 0 on the right. 0 on the left. Eyes:      General: Lids are normal. Vision grossly intact. Conjunctiva/sclera: Conjunctivae normal.   Neck:      Musculoskeletal: Neck supple. Trachea: Phonation normal.   Cardiovascular:      Rate and Rhythm: Normal rate and regular rhythm. Heart sounds: Normal heart sounds, S1 normal and S2 normal. No murmur. No friction rub. No gallop. Pulmonary:      Effort: Pulmonary effort is normal. No respiratory distress. Breath sounds: Normal breath sounds and air entry. No wheezing, rhonchi or rales. Abdominal:      General: Abdomen is flat. Palpations: Abdomen is soft. Musculoskeletal:         General: No tenderness or deformity. Lymphadenopathy:      Head:      Right side of head: No tonsillar adenopathy. Left side of head: No tonsillar adenopathy. Skin:     General: Skin is warm and dry. Capillary Refill: Capillary refill takes less than 2 seconds. Neurological:      General: No focal deficit present. Mental Status: He is alert, oriented to person, place, and time and easily aroused.    Psychiatric:         Attention and Perception: Attention normal.         Mood and Affect: Mood normal.         Speech: Speech normal. Behavior: Behavior normal. Behavior is cooperative. BP (!) 150/82   Pulse 72   Temp 98.2 °F (36.8 °C)   Resp 18   Ht 5' 10\" (1.778 m)   Wt 210 lb (95.3 kg)   SpO2 98%   BMI 30.13 kg/m²     :Assessment       Diagnosis Orders   1. Nausea     2. Nasal congestion     3. Cough with exposure to COVID-19 virus         :Plan    No orders of the defined types were placed in this encounter. Return if symptoms worsen or fail to improve. No orders of the defined types were placed in this encounter. Based on patient's symptoms today, it is highly suspected that they have COVID 19. Since pt is being tested for COVID pt has been instructed to quarantine from contacts until testing has been resulted. Further instructions will follow, as of now, this is 14 days unless otherwise specified when results are back. If SOB or worsening sx's develop, need to go to ED or return to clinic, pt voiced understanding. Pt was given printed instructions today on Possible COVID-19 infection with self-quarantine and management of symptoms    Call or return to clinic prn if these symptoms worsen or fail to improve as anticipated. Patient Instructions     Plenty of fluids  Rest  OTC Tylenol or Motrin as needed  Stay home and stay in until we call with results  Follow up with PCP or return to Urgent Care for worsening or unresolved symptoms. Patient Education        Learning About Coronavirus [phone]  What is coronavirus (COVID-19)? COVID-19 is a disease caused by a new type of coronavirus. This illness was first found in December 2019. It has since spread worldwide. Coronaviruses are a large group of viruses. They cause the common cold. They also cause more serious illnesses like [place] respiratory syndrome (MERS) and severe acute respiratory syndrome (SARS). COVID-19 is caused by a novel coronavirus. That means it's a new type that has not been seen in people before. What are the symptoms? Coronavirus (COVID-19) symptoms may include:  · Fever. · Cough. · Trouble breathing. · Chills or repeated shaking with chills. · Muscle pain. · Headache. · Sore throat. · New loss of taste or smell. · Vomiting. · Diarrhea. In severe cases, COVID-19 can cause pneumonia and make it hard to breathe without help from a machine. It can cause death. How is it diagnosed? COVID-19 is diagnosed with a viral test. This may also be called a PCR test or antigen test. It looks for evidence of the virus in your breathing passages or lungs (respiratory system). The test is most often done on a sample from the nose, throat, or lungs. It's sometimes done on a sample of saliva. One way a sample is collected is by putting a long swab into the back of your nose. How is it treated? Mild cases of COVID-19 can be treated at home. Serious cases need treatment in the hospital. Treatment may include medicines to reduce symptoms, plus breathing support such as oxygen therapy or a ventilator. Some people may be placed on their belly to help their oxygen levels. Treatments that may help people who have COVID-19 include:  Antiviral medicines. These medicines treat viral infections. Remdesivir is an example. Immune-based therapy. These medicines help the immune system fight COVID-19. One example is bamlanivimab. It's a monoclonal antibody. Blood thinners. These medicines help prevent blood clots. People with severe illness are at risk for blood clots. How can you protect yourself and others? The best way to protect yourself from getting sick is to:  · Avoid areas where there is an outbreak. · Avoid contact with people who may be infected. · Avoid crowds and try to stay at least 6 feet away from other people. · Wash your hands often, especially after you cough or sneeze. Use soap and water, and scrub for at least 20 seconds. If soap and water aren't available, use an alcohol-based hand . · Avoid touching your mouth, nose, and eyes. To help avoid spreading the virus to others:  · Stay home if you are sick or have been exposed to the virus. Don't go to school, work, or public areas. And don't use public transportation, ride-shares, or taxis unless you have no choice. · Wear a cloth face cover if you have to go to public areas. · Cover your mouth with a tissue when you cough or sneeze. Then throw the tissue in the trash and wash your hands right away. · If you're sick:  ? Leave your home only if you need to get medical care. But call the doctor's office first so they know you're coming. And wear a face cover. ? Wear the face cover whenever you're around other people. It can help stop the spread of the virus when you cough or sneeze. ? Limit contact with pets and people in your home. If possible, stay in a separate bedroom and use a separate bathroom. ? Clean and disinfect your home every day. Use household  and disinfectant wipes or sprays. Take special care to clean things that you grab with your hands. These include doorknobs, remote controls, phones, and handles on your refrigerator and microwave. And don't forget countertops, tabletops, bathrooms, and computer keyboards. When should you call for help? Call 911 anytime you think you may need emergency care. For example, call if you have life-threatening symptoms, such as:    · You have severe trouble breathing. (You can't talk at all.)     · You have constant chest pain or pressure.     · You are severely dizzy or lightheaded.     · You are confused or can't think clearly.     · Your face and lips have a blue color.     · You pass out (lose consciousness) or are very hard to wake up. Call your doctor now or seek immediate medical care if:    · You have moderate trouble breathing. (You can't speak a full sentence.)     · You are coughing up blood (more than about 1 teaspoon).   · You have signs of low blood pressure. These include feeling lightheaded; being too weak to stand; and having cold, pale, clammy skin. Watch closely for changes in your health, and be sure to contact your doctor if:    · Your symptoms get worse.     · You are not getting better as expected. Call before you go to the doctor's office. Follow their instructions. And wear a cloth face cover. Current as of: December 18, 2020               Content Version: 12.7  © 2006-2021 [employer]. Care instructions adapted under license by [facility] ([facility]). If you have questions about a medical condition or this instruction, always ask your healthcare professional. [place] [address] any warranty or liability for your use of this information. Patient Education        Coronavirus ([id number]): Care Instructions  Overview  The coronavirus disease (COVID-19) is caused by a virus. Symptoms may include a fever, a cough, and shortness of breath. It mainly spreads person-to-person through droplets from coughing and sneezing. The virus also can spread when people are in close contact with someone who is infected. Most people have mild symptoms and can take care of themselves at home. If their symptoms get worse, they may need care in a hospital. Treatment may include medicines to reduce symptoms, plus breathing support such as oxygen therapy or a ventilator. It's important to not spread the virus to others. If you have COVID-19, wear a face cover anytime you are around other people. You need to isolate yourself while you are sick. Leave your home only if you need to get medical care or testing. Follow-up care is a key part of your treatment and safety. Be sure to make and go to all appointments, and call your doctor if you are having problems. It's also a good idea to know your test results and keep a list of the medicines you take. How can you care for yourself at home? · Get extra rest. It can help you feel better. · Drink plenty of fluids. This helps replace fluids lost from fever. Fluids also help ease a scratchy throat. Water, soup, fruit juice, and hot tea with lemon are good choices. · Take acetaminophen (such as Tylenol) to reduce a fever. It may also help with muscle aches. Read and follow all instructions on the label. · Use petroleum jelly on sore skin. This can help if the skin around your nose and lips becomes sore from rubbing a lot with tissues. Tips for self-isolation  · Limit contact with people in your home. If possible, stay in a separate bedroom and use a separate bathroom. · Wear a cloth face cover when you are around other people. It can help stop the spread of the virus when you cough or sneeze. · If you have to leave home, avoid crowds and try to stay at least 6 feet away from other people. · Avoid contact with pets and other animals. · Cover your mouth and nose with a tissue when you cough or sneeze. Then throw it in the trash right away. · Wash your hands often, especially after you cough or sneeze. Use soap and water, and scrub for at least 20 seconds. If soap and water aren't available, use an alcohol-based hand . · Don't share personal household items. These include bedding, towels, cups and glasses, and eating utensils. · [address] in the warmest water allowed for the fabric type, and dry it completely. It's okay to wash other people's laundry with yours. · Clean and disinfect your home every day. Use household  and disinfectant wipes or sprays. Take special care to clean things that you grab with your hands. These include doorknobs, remote controls, phones, and handles on your refrigerator and microwave. And don't forget countertops, tabletops, bathrooms, and computer keyboards.   When you can end self-isolation  · If you know or suspect that you have COVID-19, stay in self-isolation until: ? You haven't had a fever for 24 hours while not taking medicines to lower the fever, and  ? Your symptoms have improved, and  ? It's been at least 10 days since your symptoms started. · Talk to your doctor about whether you also need testing, especially if you have a weakened immune system. When should you call for help? Call 911 anytime you think you may need emergency care. For example, call if you have life-threatening symptoms, such as:    · You have severe trouble breathing. (You can't talk at all.)     · You have constant chest pain or pressure.     · You are severely dizzy or lightheaded.     · You are confused or can't think clearly.     · Your face and lips have a blue color.     · You pass out (lose consciousness) or are very hard to wake up. Call your doctor now or seek immediate medical care if:    · You have moderate trouble breathing. (You can't speak a full sentence.)     · You are coughing up blood (more than about 1 teaspoon).     · You have signs of low blood pressure. These include feeling lightheaded; being too weak to stand; and having cold, pale, clammy skin. Watch closely for changes in your health, and be sure to contact your doctor if:    · Your symptoms get worse.     · You are not getting better as expected. Call before you go to the doctor's office. Follow their instructions. And wear a cloth face cover. Current as of: December 18, 2020               Content Version: 12.7  © 9686-6726 [employer]. Care instructions adapted under license by [facility] ([facility]). If you have questions about a medical condition or this instruction, always ask your healthcare professional. [place] [address] any warranty or liability for your use of this information. Patient given educational materials- see patient instructions. Discussed use, benefit, and side effects of prescribedmedications. All patient questions answered. Pt voiced understanding. Electronically signed by JAYJAY Snyder CNP on 2/1/2021 at 5:06 PM

## 2021-02-03 LAB — SARS-COV-2, NAA: NOT DETECTED

## 2021-03-19 ENCOUNTER — NURSE TRIAGE (OUTPATIENT)
Dept: OTHER | Facility: CLINIC | Age: 61
End: 2021-03-19

## 2021-03-19 NOTE — TELEPHONE ENCOUNTER
Call received on [address]. No triage , information only. [facility] [place] information shared with understanding: \"Are you feeling sick today? There is no evidence that acute illness reduces vaccine efficacy or increases vaccine adverse events. However, as a precaution with  moderate or severe acute illness, all vaccines should be delayed until the illness has improved. Mild illnesses are NOT contraindications to  vaccination. Do not withhold vaccination if a person is taking antibiotics\"    Care advice provided. Recommended using local department of health website for testing locations and up to date information. Caller verbalizes understanding, denies any other questions or concerns; instructed to call back for any new or worsening symptoms. Reason for Disposition   Health Information question, no triage required and triager able to answer question    Answer Assessment - Initial Assessment Questions  1. REASON FOR CALL or QUESTION: \"What is your reason for calling today? \" or \"How can I best help you? \" or \"What question do you have that I can help answer? \"    If I flu like symptoms can I get the vaccine?     Protocols used: INFORMATION ONLY CALL - NO TRIAGE-[facility]

## 2021-03-22 ENCOUNTER — OFFICE VISIT (OUTPATIENT)
Age: 61
End: 2021-03-22

## 2021-03-22 ENCOUNTER — OFFICE VISIT (OUTPATIENT)
Dept: URGENT CARE | Age: 61
End: 2021-03-22
Payer: OTHER GOVERNMENT

## 2021-03-22 VITALS
DIASTOLIC BLOOD PRESSURE: 84 MMHG | HEART RATE: 66 BPM | OXYGEN SATURATION: 98 % | BODY MASS INDEX: 29.49 KG/M2 | RESPIRATION RATE: 18 BRPM | WEIGHT: 206 LBS | HEIGHT: 70 IN | SYSTOLIC BLOOD PRESSURE: 161 MMHG | TEMPERATURE: 97.8 F

## 2021-03-22 DIAGNOSIS — Z11.59 SCREENING FOR VIRAL DISEASE: Primary | ICD-10-CM

## 2021-03-22 DIAGNOSIS — J06.9 VIRAL UPPER RESPIRATORY TRACT INFECTION: Primary | ICD-10-CM

## 2021-03-22 PROCEDURE — 99999 PR OFFICE/OUTPT VISIT,PROCEDURE ONLY: CPT | Performed by: NURSE PRACTITIONER

## 2021-03-22 PROCEDURE — 99213 OFFICE O/P EST LOW 20 MIN: CPT | Performed by: NURSE PRACTITIONER

## 2021-03-22 ASSESSMENT — ENCOUNTER SYMPTOMS
SORE THROAT: 1
RHINORRHEA: 0
EYES NEGATIVE: 1
COUGH: 1
VOICE CHANGE: 0
SINUS PRESSURE: 0
ALLERGIC/IMMUNOLOGIC NEGATIVE: 1
SHORTNESS OF BREATH: 1
GASTROINTESTINAL NEGATIVE: 1
TROUBLE SWALLOWING: 0

## 2021-03-22 NOTE — PROGRESS NOTES
[facility]  [address] [id number]  Dept: [phone]  Dept Fax: [phone]  Loc: [phone]     Steve Adames is a 63 y.o. male who presents today for his medical conditions/complaintsas noted below. Steve Adames is c/o of Nasal Congestion (onset about 2 weeks ago, report he had emesis x1 2 weeks ago), Congestion, and Cough        HPI:     HPI    Pt presents with cough, congestion for 2 weeks. Denies fever, sore throat, dizziness, weakness. States he is scheduled to get covid vaccine and wants to make sure he doesn't have covid. States he has some SOB when coughing. Denies chest pain or any other symptom. States he's been taking otc meds which have helped with s/s.       Past Medical History:   Diagnosis Date    Head injury     Headache       Past Surgical History:   Procedure Laterality Date    HERNIA REPAIR         Family History   Problem Relation Age of Onset    Cancer Brother     Diabetes Brother         type 2       Social History     Tobacco Use    Smoking status: Former Smoker     Packs/day: 3.00     Years: 26.00     Pack years: 78.00     Types: Cigarettes     Start date: 1966     Quit date: 1990     Years since quittin.2    Smokeless tobacco: Former User   Substance Use Topics    Alcohol use: Yes     Comment: daily      Current Outpatient Medications   Medication Sig Dispense Refill    aspirin-acetaminophen-caffeine (EXCEDRIN EXTRA STRENGTH) 250-250-65 MG per tablet Excedrin Extra Strength      vitamin B-12 (CYANOCOBALAMIN) 1000 MCG tablet Take 1,000 mcg by mouth daily      Cholecalciferol (VITAMIN D3) 25 MCG TABS Take by mouth      magnesium (MAGNESIUM-OXIDE) 250 MG TABS tablet Take 250 mg by mouth daily      Ginkgo Biloba 40 MG TABS Take by mouth      Glucosamine-MSM-Hyaluronic Acd (JOINT HEALTH PO) Take by mouth      naproxen (NAPROSYN) 500 MG tablet Take 1 tablet by mouth 2 times daily as needed for Pain Take with food. 30 tablet 0    Multiple Vitamin (MULTI-VITAMIN DAILY PO) Take by mouth      omeprazole (PRILOSEC) 20 MG delayed release capsule Take 20 mg by mouth daily       No current facility-administered medications for this visit. Allergies   Allergen Reactions    Hydrocodone Nausea Only       Health Maintenance   Topic Date Due    Hepatitis C screen  Never done    HIV screen  Never done    COVID-19 Vaccine (1) Never done    DTaP/Tdap/Td vaccine (1 - Tdap) Never done    Diabetes screen  Never done    Shingles Vaccine (1 of 2) Never done    Colon cancer screen colonoscopy  Never done    Flu vaccine (1) 09/01/2020    Lipid screen  09/24/2024    Hepatitis A vaccine  Aged Out    Hepatitis B vaccine  Aged Out    Hib vaccine  Aged Out    Meningococcal (ACWY) vaccine  Aged Out    Pneumococcal 0-64 years Vaccine  Aged Out       Subjective:     Review of Systems   Constitutional: Negative. Negative for chills, fatigue and fever. HENT: Positive for congestion and sore throat. Negative for ear pain, rhinorrhea, sinus pressure, sneezing, trouble swallowing and voice change. Eyes: Negative. Respiratory: Positive for cough and shortness of breath. Cardiovascular: Negative. Gastrointestinal: Negative. Endocrine: Negative. Genitourinary: Negative. Musculoskeletal: Negative. Skin: Negative. Negative for rash. Allergic/Immunologic: Negative. Neurological: Negative. Hematological: Negative. Psychiatric/Behavioral: Negative. Objective:     Physical Exam  Vitals signs reviewed. Constitutional:       Appearance: Normal appearance. He is well-developed. HENT:      Head: Normocephalic. Right Ear: Hearing, tympanic membrane, ear canal and external ear normal.      Left Ear: Hearing, tympanic membrane, ear canal and external ear normal.      Nose: Nose normal.      Right Sinus: No maxillary sinus tenderness or frontal sinus tenderness. Left Sinus: No maxillary sinus tenderness or frontal sinus tenderness. Mouth/Throat:      Mouth: Mucous membranes are moist.      Pharynx: Uvula midline. Tonsils: 0 on the right. 0 on the left. Eyes:      Conjunctiva/sclera: Conjunctivae normal.   Neck:      Musculoskeletal: Normal range of motion. Cardiovascular:      Rate and Rhythm: Normal rate and regular rhythm. Pulmonary:      Effort: Pulmonary effort is normal.      Breath sounds: Normal breath sounds. Abdominal:      General: Bowel sounds are normal.      Palpations: Abdomen is soft. Lymphadenopathy:      Head:      Right side of head: No submental, submandibular, tonsillar, preauricular, posterior auricular or occipital adenopathy. Left side of head: No submental, submandibular, tonsillar, preauricular, posterior auricular or occipital adenopathy. Cervical: No cervical adenopathy. Skin:     General: Skin is warm and moist.      Capillary Refill: Capillary refill takes less than 2 seconds. Findings: No rash. Neurological:      Mental Status: He is alert and oriented to person, place, and time. Psychiatric:         Speech: Speech normal.         Behavior: Behavior normal.         Thought Content: Thought content normal.         Judgment: Judgment normal.       BP (!) 161/84   Pulse 66   Temp 97.8 °F (36.6 °C) (Temporal)   Resp 18   Ht 5' 10\" (1.778 m)   Wt 206 lb (93.4 kg)   SpO2 98%   BMI 29.56 kg/m²     Assessment:          Diagnosis Orders   1. Viral upper respiratory tract infection         Plan:    No orders of the defined types were placed in this encounter. No follow-ups on file. No orders of the defined types were placed in this encounter. No orders of the defined types were placed in this encounter. Patient given educationalmaterials - see patient instructions. Discussed use, benefit, and side effectsof prescribed medications. All patient questions answered.   Pt voiced understanding. Reviewed health maintenance. Instructed to continue current medications, diet andexercise. Patient agreed with treatment plan. Follow up as directed. Patient Instructions   1. Continue OTC meds for symptomatic treatment  2. Quarantine until results of COVID are confirmed  3.  Follow up if symptoms worsen or fail to improve        Electronically signed by JAYJAY Zaragoza CNP on 3/22/2021 at 6:36 PM

## 2021-03-24 LAB — SARS-COV-2, NAA: NOT DETECTED

## 2021-04-05 ENCOUNTER — IMMUNIZATION (OUTPATIENT)
Age: 61
End: 2021-04-05
Payer: OTHER GOVERNMENT

## 2021-04-05 PROCEDURE — 91300 COVID-19, PFIZER VACCINE 30MCG/0.3ML DOSE: CPT | Performed by: FAMILY MEDICINE

## 2021-04-05 PROCEDURE — 0001A PR IMM ADMN SARSCOV2 30MCG/0.3ML DIL RECON 1ST DOSE: CPT | Performed by: FAMILY MEDICINE

## 2021-04-26 ENCOUNTER — IMMUNIZATION (OUTPATIENT)
Age: 61
End: 2021-04-26
Payer: OTHER GOVERNMENT

## 2021-04-26 PROCEDURE — 0002A PR IMM ADMN SARSCOV2 30MCG/0.3ML DIL RECON 2ND DOSE: CPT | Performed by: FAMILY MEDICINE

## 2021-04-26 PROCEDURE — 91300 COVID-19, PFIZER VACCINE 30MCG/0.3ML DOSE: CPT | Performed by: FAMILY MEDICINE

## 2021-06-21 ENCOUNTER — OFFICE VISIT (OUTPATIENT)
Dept: FAMILY MEDICINE CLINIC | Age: 61
End: 2021-06-21
Payer: OTHER GOVERNMENT

## 2021-06-21 VITALS
DIASTOLIC BLOOD PRESSURE: 80 MMHG | TEMPERATURE: 97.1 F | SYSTOLIC BLOOD PRESSURE: 126 MMHG | HEIGHT: 70 IN | HEART RATE: 84 BPM | OXYGEN SATURATION: 98 % | BODY MASS INDEX: 29.2 KG/M2 | WEIGHT: 204 LBS

## 2021-06-21 DIAGNOSIS — N52.9 ERECTILE DYSFUNCTION, UNSPECIFIED ERECTILE DYSFUNCTION TYPE: ICD-10-CM

## 2021-06-21 DIAGNOSIS — Z11.59 NEED FOR HEPATITIS C SCREENING TEST: ICD-10-CM

## 2021-06-21 DIAGNOSIS — Z12.5 ENCOUNTER FOR PROSTATE CANCER SCREENING: ICD-10-CM

## 2021-06-21 DIAGNOSIS — Z13.220 LIPID SCREENING: ICD-10-CM

## 2021-06-21 DIAGNOSIS — Z00.00 ANNUAL PHYSICAL EXAM: Primary | ICD-10-CM

## 2021-06-21 PROCEDURE — 99396 PREV VISIT EST AGE 40-64: CPT | Performed by: FAMILY MEDICINE

## 2021-06-21 RX ORDER — SILDENAFIL 100 MG/1
100 TABLET, FILM COATED ORAL PRN
Qty: 90 TABLET | Refills: 3 | Status: SHIPPED | OUTPATIENT
Start: 2021-06-21

## 2021-06-21 ASSESSMENT — PATIENT HEALTH QUESTIONNAIRE - PHQ9
2. FEELING DOWN, DEPRESSED OR HOPELESS: 0
SUM OF ALL RESPONSES TO PHQ QUESTIONS 1-9: 0
SUM OF ALL RESPONSES TO PHQ9 QUESTIONS 1 & 2: 0
1. LITTLE INTEREST OR PLEASURE IN DOING THINGS: 0
SUM OF ALL RESPONSES TO PHQ QUESTIONS 1-9: 0
SUM OF ALL RESPONSES TO PHQ QUESTIONS 1-9: 0

## 2021-06-21 NOTE — PATIENT INSTRUCTIONS
We are committed to providing you with the best care possible. In order to help us achieve these goals please remember to bring all medications, herbal products, and over the counter supplements with you to each visit. If your provider has ordered testing for you, please be sure to follow up with our office if you have not received results within 7 days after the testing took place. *If you receive a survey after visiting one of our offices, please take time to share your experience concerning your physician office visit. These surveys are confidential and no health information about you is shared. We are eager to improve for you and we are counting on your feedback to help make that happen.  _______________________________________________________________    Go to room 201 for labs prior to next visit. Be sure to fast for at least 12 hours prior to laboratory appointment. Would recommend getting labs about 1 week prior to the appointment time to ensure they are available at the time of the appointment. No appointment is necessary for laboratory work as the orders have already been placed.     Lab opens at 6:30 am and closes at 5:00 pm.

## 2021-06-21 NOTE — PROGRESS NOTES
[facility] PHYSICIAN SERVICES  [facility] MED[facility]  [address] [facility] [address] [address]  Dept: [phone]  Dept Fax: [phone]  Loc: [phone]    Kelsy Brantley is a 63 y.o. male who presents today for his medical conditions/complaints as noted below. Kelsy Brantley is here for Follow-up        HPI:   CC: Here today to discuss the following:    Here for annual physical.    Has been experiencing some erectile dysfunction. Wants to know what medications are available. HPI    Past Medical History:   Diagnosis Date    Head injury     Headache       Past Surgical History:   Procedure Laterality Date    HERNIA REPAIR         Family History   Problem Relation Age of Onset    Cancer Brother     Diabetes Brother         type 2       Social History     Tobacco Use    Smoking status: Former Smoker     Packs/day: 3.00     Years: 26.00     Pack years: 78.00     Types: Cigarettes     Start date: 1966     Quit date: 1990     Years since quittin.4    Smokeless tobacco: Former User   Substance Use Topics    Alcohol use: Yes     Comment: daily     Current Outpatient Medications   Medication Sig Dispense Refill    sildenafil (VIAGRA) 100 MG tablet Take 1 tablet by mouth as needed for Erectile Dysfunction 90 tablet 3    aspirin-acetaminophen-caffeine (EXCEDRIN EXTRA STRENGTH) 250-250-65 MG per tablet Excedrin Extra Strength      vitamin B-12 (CYANOCOBALAMIN) 1000 MCG tablet Take 1,000 mcg by mouth daily      Cholecalciferol (VITAMIN D3) 25 MCG TABS Take by mouth      magnesium (MAGNESIUM-OXIDE) 250 MG TABS tablet Take 250 mg by mouth daily      Ginkgo Biloba 40 MG TABS Take by mouth      Glucosamine-MSM-Hyaluronic Acd (JOINT HEALTH PO) Take by mouth      naproxen (NAPROSYN) 500 MG tablet Take 1 tablet by mouth 2 times daily as needed for Pain Take with food.  30 tablet 0    Multiple Vitamin (MULTI-VITAMIN DAILY PO) Take by mouth      omeprazole (PRILOSEC) 20 MG delayed release capsule Take 20 mg by mouth daily       No current facility-administered medications for this visit. Allergies   Allergen Reactions    Hydrocodone Nausea Only       Health Maintenance   Topic Date Due    Hepatitis C screen  Never done    HIV screen  Never done    DTaP/Tdap/Td vaccine (1 - Tdap) Never done    Diabetes screen  Never done    Shingles Vaccine (1 of 2) Never done    Colon cancer screen colonoscopy  Never done    Flu vaccine (Season Ended) 09/01/2021    Lipid screen  09/24/2024    COVID-19 Vaccine  Completed    Hepatitis A vaccine  Aged Out    Hepatitis B vaccine  Aged Out    Hib vaccine  Aged Out    Meningococcal (ACWY) vaccine  Aged Out    Pneumococcal 0-64 years Vaccine  Aged Out       Subjective:      Review of Systems    Review of Systems   Constitutional: Negative for chills and fever. HENT: Negative for congestion. Respiratory: Negative for cough, chest tightness and shortness of breath. Cardiovascular: Negative for chest pain, palpitations and leg swelling. Gastrointestinal: Negative for abdominal pain, anal bleeding, constipation, diarrhea and nausea. Genitourinary: Negative for difficulty urinating. Psychiatric/Behavioral: Negative. SeeHPI for visit specific review of symptoms. All others negative      Objective:   /80   Pulse 84   Temp 97.1 °F (36.2 °C)   Ht 5' 10\" (1.778 m)   Wt 204 lb (92.5 kg)   SpO2 98%   BMI 29.27 kg/m²   Physical Exam    Physical Exam   Constitutional: He appears well-developed. Does not appear ill. Neck: Normal range of motion. Neck supple. No masses. Neck Symmetric. Normal tracheal position. No thyroid enlargement  Cardiovascular: Normal rate and regular rhythm. Exam reveals no friction rub. Carotid arteries: no bruit observed. No murmur heard. Respiratory:  Effort normal and breath sounds normal. No respiratory distress. No wheezes. No rales.  No use of accessory muscles or intercostal retractions. Neurological: alert. Psychiatric: normal mood and affect. His behavior is normal. Normal judgement and insight observed. No results found for this or any previous visit (from the past 672 hour(s)). Assessment & Plan: The following diagnoses and conditions are stable with no further orders unless indicated:  1. Annual physical exam  Discussed lifestyle changes such as diet and exercise. Recommended eliminate processed food from diet such as sugar and fried foods. Recommended exercising at least 150 minutes/week. Try to do full body resistance training twice a week as well. Offered suggestions for calorie counting such as phone apps and online resources such as [employer] pal and Lose it. Discussed healthy weight. Declines colonoscopy  - CBC Auto Differential; Future  - Comprehensive Metabolic Panel; Future    2. Encounter for prostate cancer screening  Check PSA  - PSA screening; Future    3. Lipid screening    - Lipid Panel; Future    4. Need for hepatitis C screening test    - Hepatitis C Antibody; Future    5. Erectile dysfunction, unspecified erectile dysfunction type  Discussed medications and potential side effects. Reviewed commonly used medication such as Viagra and Cialis. He agreed to try Viagra. Given a coupon card. Discussed risks and benefits of this new medication. Discussed potential side effects. He voiced understanding. Return in about 1 year (around 6/21/2022) for Yearly Physical - 30 minute visit. Discussed use, benefit, and side effects of prescribed medications. All patient questions answered. Pt voiced understanding. Reviewed health maintenance. Instructedto continue current medications, diet and exercise. Patient agreed with treatmentplan. Follow up as directed. Note dictated using Dragon Dictation software  Sometimes this dictation software makes erroneous transcriptions.

## 2022-06-22 NOTE — PROGRESS NOTES
[facility] PHYSICIAN SERVICES  [facility] FAMILY MEDICINE  [address] [facility] [address] [address]  Dept: [phone]  Dept Fax: [phone]  Loc: [phone]    Janet Baldwin is a 57 y.o. male who presents today for his medical conditions/complaints as noted below. Janet Baldwin is here for Annual Exam        HPI:   CC: Here today to discuss the followin-year-old here for yearly physical  No major concerns or complaints voiced today. HPI    Subjective:      Review of Systems  Review of Systems   Constitutional: Negative for chills and fever. HENT: Negative for congestion. Respiratory: Negative for cough, chest tightness and shortness of breath. Cardiovascular: Negative for chest pain, palpitations and leg swelling. Gastrointestinal: Negative for abdominal pain, anal bleeding, constipation, diarrhea and nausea. Genitourinary: Negative for difficulty urinating. Psychiatric/Behavioral: Negative. SeeHPI for visit specific review of symptoms. All others negative      Objective:   /86   Pulse 59   Temp 97.1 °F (36.2 °C)   Ht 5' 10\" (1.778 m)   Wt 209 lb (94.8 kg)   SpO2 97%   BMI 29.99 kg/m²   Physical Exam    Physical Exam   Constitutional: He appears well-developed. Does not appear ill. Neck: Neck supple. No masses. Neck Symmetric. Normal tracheal position. No thyroid enlargement  Cardiovascular: Normal rate and regular rhythm. Exam reveals no friction rub. No murmur heard. Respiratory:  Effort normal and breath sounds normal. No respiratory distress. No wheezes. No rales. No use of accessory muscles or intercostal retractions. Neurological: alert. Psychiatric: normal mood and affect. His behavior is normal.     No results found for this or any previous visit (from the past 672 hour(s)). Assessment & Plan: The following diagnoses and conditions are stable with no further orders unless indicated:  1.  Annual physical exam  Suggest we check the following laboratory studies. Reviewed dietary recommendations. Recommended eliminating sugar and fried food  Reducing red meat  - CBC with Auto Differential; Future  - Comprehensive Metabolic Panel; Future  - Lipid Panel; Future    2. Encounter for prostate cancer screening    - PSA Screening; Future            Return in about 1 year (around 2023) for Yearly Physical - 30 minute visit. Discussed use, benefit, and side effects of prescribed medications. All patient questions answered. Pt voiced understanding. Reviewed health maintenance. Instructedto continue current medications, diet and exercise. Patient agreed with treatmentplan.  Follow up as directed.     _______________________________________________________________      Past Medical History:   Diagnosis Date    Head injury     Headache       Past Surgical History:   Procedure Laterality Date    HERNIA REPAIR         Family History   Problem Relation Age of Onset    Cancer Brother     Diabetes Brother         type 2       Social History     Tobacco Use    Smoking status: Former Smoker     Packs/day: 3.00     Years: 26.00     Pack years: 78.00     Types: Cigarettes     Start date: 1966     Quit date: 1990     Years since quittin.4    Smokeless tobacco: Former User   Substance Use Topics    Alcohol use: Yes     Comment: daily     Current Outpatient Medications   Medication Sig Dispense Refill    mometasone (NASONEX) 50 MCG/ACT nasal spray 2 sprays by Nasal route daily 1 each 5    sildenafil (VIAGRA) 100 MG tablet Take 1 tablet by mouth as needed for Erectile Dysfunction 90 tablet 3    aspirin-acetaminophen-caffeine (EXCEDRIN EXTRA STRENGTH) 250-250-65 MG per tablet Excedrin Extra Strength      vitamin B-12 (CYANOCOBALAMIN) 1000 MCG tablet Take 1,000 mcg by mouth daily      Cholecalciferol (VITAMIN D3) 25 MCG TABS Take by mouth      magnesium (MAGNESIUM-OXIDE) 250 MG TABS tablet Take 250 mg by mouth daily      Ginkgo Biloba 40 MG TABS Take by mouth      Glucosamine-MSM-Hyaluronic Acd (JOINT HEALTH PO) Take by mouth      naproxen (NAPROSYN) 500 MG tablet Take 1 tablet by mouth 2 times daily as needed for Pain Take with food. 30 tablet 0    Multiple Vitamin (MULTI-VITAMIN DAILY PO) Take by mouth      omeprazole (PRILOSEC) 20 MG delayed release capsule Take 20 mg by mouth daily       No current facility-administered medications for this visit. Allergies   Allergen Reactions    Hydrocodone Nausea Only       Health Maintenance   Topic Date Due    HIV screen  Never done    Hepatitis C screen  Never done    DTaP/Tdap/Td vaccine (1 - Tdap) Never done    Diabetes screen  Never done    Colorectal Cancer Screen  Never done    Shingles vaccine (1 of 2) Never done    Prostate Specific Antigen (PSA) Screening or Monitoring  09/24/2020    Depression Screen  06/21/2022    Flu vaccine (Season Ended) 09/01/2022    Lipids  09/24/2024    COVID-19 Vaccine  Completed    Hepatitis A vaccine  Aged Out    Hepatitis B vaccine  Aged Out    Hib vaccine  Aged Out    Meningococcal (ACWY) vaccine  Aged Out    Pneumococcal 0-64 years Vaccine  Aged Out       _______________________________________________________________    Note dictated using [email or web address]  Sometimes this dictation software makes erroneous transcriptions.

## 2022-06-23 ENCOUNTER — OFFICE VISIT (OUTPATIENT)
Dept: FAMILY MEDICINE CLINIC | Age: 62
End: 2022-06-23
Payer: OTHER GOVERNMENT

## 2022-06-23 VITALS
BODY MASS INDEX: 29.92 KG/M2 | WEIGHT: 209 LBS | HEART RATE: 59 BPM | SYSTOLIC BLOOD PRESSURE: 122 MMHG | TEMPERATURE: 97.1 F | DIASTOLIC BLOOD PRESSURE: 86 MMHG | HEIGHT: 70 IN | OXYGEN SATURATION: 97 %

## 2022-06-23 DIAGNOSIS — Z12.5 ENCOUNTER FOR PROSTATE CANCER SCREENING: ICD-10-CM

## 2022-06-23 DIAGNOSIS — Z00.00 ANNUAL PHYSICAL EXAM: Primary | ICD-10-CM

## 2022-06-23 PROCEDURE — 99396 PREV VISIT EST AGE 40-64: CPT | Performed by: FAMILY MEDICINE

## 2022-06-23 RX ORDER — MOMETASONE FUROATE 50 UG/1
2 SPRAY, METERED NASAL DAILY
Qty: 1 EACH | Refills: 5 | Status: SHIPPED | OUTPATIENT
Start: 2022-06-23

## 2022-06-23 ASSESSMENT — PATIENT HEALTH QUESTIONNAIRE - PHQ9
1. LITTLE INTEREST OR PLEASURE IN DOING THINGS: 0
SUM OF ALL RESPONSES TO PHQ9 QUESTIONS 1 & 2: 0
SUM OF ALL RESPONSES TO PHQ QUESTIONS 1-9: 0
SUM OF ALL RESPONSES TO PHQ QUESTIONS 1-9: 0
2. FEELING DOWN, DEPRESSED OR HOPELESS: 0
SUM OF ALL RESPONSES TO PHQ QUESTIONS 1-9: 0
SUM OF ALL RESPONSES TO PHQ QUESTIONS 1-9: 0

## 2022-09-23 DIAGNOSIS — Z00.00 ANNUAL PHYSICAL EXAM: ICD-10-CM

## 2022-09-23 DIAGNOSIS — Z12.5 ENCOUNTER FOR PROSTATE CANCER SCREENING: ICD-10-CM

## 2022-09-23 LAB
ALBUMIN SERPL-MCNC: 4.7 G/DL (ref 3.5–5.2)
ALP BLD-CCNC: 51 U/L (ref 40–130)
ALT SERPL-CCNC: 47 U/L (ref 5–41)
ANION GAP SERPL CALCULATED.3IONS-SCNC: 10 MMOL/L (ref 7–19)
AST SERPL-CCNC: 30 U/L (ref 5–40)
BASOPHILS ABSOLUTE: 0 K/UL (ref 0–0.2)
BASOPHILS RELATIVE PERCENT: 0.6 % (ref 0–1)
BILIRUB SERPL-MCNC: 0.5 MG/DL (ref 0.2–1.2)
BUN BLDV-MCNC: 20 MG/DL (ref 8–23)
CALCIUM SERPL-MCNC: 9.5 MG/DL (ref 8.8–10.2)
CHLORIDE BLD-SCNC: 103 MMOL/L (ref 98–111)
CHOLESTEROL, TOTAL: 269 MG/DL (ref 160–199)
CO2: 28 MMOL/L (ref 22–29)
CREAT SERPL-MCNC: 1.2 MG/DL (ref 0.5–1.2)
EOSINOPHILS ABSOLUTE: 0.1 K/UL (ref 0–0.6)
EOSINOPHILS RELATIVE PERCENT: 1.2 % (ref 0–5)
GFR AFRICAN AMERICAN: >59
GFR NON-AFRICAN AMERICAN: >60
GLUCOSE BLD-MCNC: 106 MG/DL (ref 74–109)
HCT VFR BLD CALC: 48.4 % (ref 42–52)
HDLC SERPL-MCNC: 38 MG/DL (ref 55–121)
HEMOGLOBIN: 15.9 G/DL (ref 14–18)
IMMATURE GRANULOCYTES #: 0 K/UL
LDL CHOLESTEROL CALCULATED: 196 MG/DL
LYMPHOCYTES ABSOLUTE: 3 K/UL (ref 1.1–4.5)
LYMPHOCYTES RELATIVE PERCENT: 45.1 % (ref 20–40)
MCH RBC QN AUTO: 29.8 PG (ref 27–31)
MCHC RBC AUTO-ENTMCNC: 32.9 G/DL (ref 33–37)
MCV RBC AUTO: 90.8 FL (ref 80–94)
MONOCYTES ABSOLUTE: 0.5 K/UL (ref 0–0.9)
MONOCYTES RELATIVE PERCENT: 8.1 % (ref 0–10)
NEUTROPHILS ABSOLUTE: 3 K/UL (ref 1.5–7.5)
NEUTROPHILS RELATIVE PERCENT: 44.7 % (ref 50–65)
PDW BLD-RTO: 12.5 % (ref 11.5–14.5)
PLATELET # BLD: 229 K/UL (ref 130–400)
PMV BLD AUTO: 10.9 FL (ref 9.4–12.4)
POTASSIUM SERPL-SCNC: 4.5 MMOL/L (ref 3.5–5)
PROSTATE SPECIFIC ANTIGEN: 0.45 NG/ML (ref 0–4)
RBC # BLD: 5.33 M/UL (ref 4.7–6.1)
SODIUM BLD-SCNC: 141 MMOL/L (ref 136–145)
TOTAL PROTEIN: 7.6 G/DL (ref 6.6–8.7)
TRIGL SERPL-MCNC: 175 MG/DL (ref 0–149)
WBC # BLD: 6.7 K/UL (ref 4.8–10.8)

## 2022-10-14 ENCOUNTER — NURSE ONLY (OUTPATIENT)
Dept: FAMILY MEDICINE CLINIC | Age: 62
End: 2022-10-14
Payer: OTHER GOVERNMENT

## 2022-10-14 DIAGNOSIS — Z23 FLU VACCINE NEED: Primary | ICD-10-CM

## 2022-10-14 PROCEDURE — 90471 IMMUNIZATION ADMIN: CPT | Performed by: FAMILY MEDICINE

## 2022-10-14 PROCEDURE — 96372 THER/PROPH/DIAG INJ SC/IM: CPT | Performed by: FAMILY MEDICINE

## 2022-10-14 PROCEDURE — 90674 CCIIV4 VAC NO PRSV 0.5 ML IM: CPT | Performed by: FAMILY MEDICINE

## 2023-07-11 ENCOUNTER — OFFICE VISIT (OUTPATIENT)
Dept: PRIMARY CARE CLINIC | Age: 63
End: 2023-07-11
Payer: OTHER GOVERNMENT

## 2023-07-11 VITALS
HEIGHT: 70 IN | TEMPERATURE: 97.1 F | SYSTOLIC BLOOD PRESSURE: 121 MMHG | WEIGHT: 198 LBS | OXYGEN SATURATION: 98 % | HEART RATE: 57 BPM | DIASTOLIC BLOOD PRESSURE: 77 MMHG | BODY MASS INDEX: 28.35 KG/M2

## 2023-07-11 DIAGNOSIS — Z12.11 SCREEN FOR COLON CANCER: ICD-10-CM

## 2023-07-11 DIAGNOSIS — R53.83 OTHER FATIGUE: ICD-10-CM

## 2023-07-11 DIAGNOSIS — Z12.5 ENCOUNTER FOR PROSTATE CANCER SCREENING: ICD-10-CM

## 2023-07-11 DIAGNOSIS — Z00.00 ANNUAL PHYSICAL EXAM: Primary | ICD-10-CM

## 2023-07-11 DIAGNOSIS — E78.00 HYPERCHOLESTEROLEMIA: ICD-10-CM

## 2023-07-11 PROCEDURE — 99396 PREV VISIT EST AGE 40-64: CPT | Performed by: FAMILY MEDICINE

## 2023-07-11 SDOH — ECONOMIC STABILITY: FOOD INSECURITY: WITHIN THE PAST 12 MONTHS, YOU WORRIED THAT YOUR FOOD WOULD RUN OUT BEFORE YOU GOT MONEY TO BUY MORE.: NEVER TRUE

## 2023-07-11 SDOH — ECONOMIC STABILITY: HOUSING INSECURITY
IN THE LAST 12 MONTHS, WAS THERE A TIME WHEN YOU DID NOT HAVE A STEADY PLACE TO SLEEP OR SLEPT IN A SHELTER (INCLUDING NOW)?: NO

## 2023-07-11 SDOH — ECONOMIC STABILITY: FOOD INSECURITY: WITHIN THE PAST 12 MONTHS, THE FOOD YOU BOUGHT JUST DIDN'T LAST AND YOU DIDN'T HAVE MONEY TO GET MORE.: NEVER TRUE

## 2023-07-11 SDOH — ECONOMIC STABILITY: INCOME INSECURITY: HOW HARD IS IT FOR YOU TO PAY FOR THE VERY BASICS LIKE FOOD, HOUSING, MEDICAL CARE, AND HEATING?: NOT HARD AT ALL

## 2023-07-11 ASSESSMENT — PATIENT HEALTH QUESTIONNAIRE - PHQ9
2. FEELING DOWN, DEPRESSED OR HOPELESS: NOT AT ALL
SUM OF ALL RESPONSES TO PHQ QUESTIONS 1-9: 0
SUM OF ALL RESPONSES TO PHQ QUESTIONS 1-9: 0
SUM OF ALL RESPONSES TO PHQ9 QUESTIONS 1 & 2: 0
2. FEELING DOWN, DEPRESSED OR HOPELESS: 0
SUM OF ALL RESPONSES TO PHQ9 QUESTIONS 1 & 2: 0
SUM OF ALL RESPONSES TO PHQ QUESTIONS 1-9: 0
1. LITTLE INTEREST OR PLEASURE IN DOING THINGS: 0
1. LITTLE INTEREST OR PLEASURE IN DOING THINGS: NOT AT ALL
SUM OF ALL RESPONSES TO PHQ QUESTIONS 1-9: 0

## 2023-07-11 NOTE — PROGRESS NOTES
be reported to your provider. What does the score mean? The calcium score can range from zero to over 400. It should be considered with other risk factor measurements (such as race, diabetes, smoking, family history, cholesterol, blood pressure) to determine your risk for future coronary artery disease. Please ask your healthcare provider if you have any questions about the calcium-score screening heart scan. ADDITIONAL DETAILS    Is the calcium-score screening heart scan covered by insurance? You should contact your insurance to confirm coverage. Typical price is between $75 to $125. Orders Placed This Encounter   Procedures    Milo Saucedo MD, Gastroenterology, [place]     Referral Priority:   Routine     Referral Type:   Eval and Treat     Referral Reason:   Specialty Services Required     Referred to Provider:   Mateo Mulligan MD     Requested Specialty:   Gastroenterology     Number of Visits Requested:   1       Current Outpatient Medications   Medication Sig Dispense Refill    mometasone (NASONEX) 50 MCG/ACT nasal spray 2 sprays by Nasal route daily 1 each 5    sildenafil (VIAGRA) 100 MG tablet Take 1 tablet by mouth as needed for Erectile Dysfunction 90 tablet 3    aspirin-acetaminophen-caffeine (EXCEDRIN MIGRAINE) 250-250-65 MG per tablet Excedrin Extra Strength      vitamin B-12 (CYANOCOBALAMIN) 1000 MCG tablet Take 1 tablet by mouth daily      Cholecalciferol (VITAMIN D3) 25 MCG TABS Take by mouth      magnesium (MAGNESIUM-OXIDE) 250 MG TABS tablet Take 1 tablet by mouth daily      Ginkgo Biloba 40 MG TABS Take by mouth      Glucosamine-MSM-Hyaluronic Acd (JOINT HEALTH PO) Take by mouth      naproxen (NAPROSYN) 500 MG tablet Take 1 tablet by mouth 2 times daily as needed for Pain Take with food.  30 tablet 0    Multiple Vitamin (MULTI-VITAMIN DAILY PO) Take by mouth      omeprazole (PRILOSEC) 20 MG delayed release capsule Take 1 capsule by mouth daily       No current

## 2023-07-11 NOTE — PATIENT INSTRUCTIONS
Get labs in September:   Labs before next appointment:  Go to room 405 for labs prior to next visit. Be sure to fast for at least 10 hours prior to laboratory appointment. Would recommend getting labs about 1 week prior to the appointment time to ensure they are available at the time of the appointment. No appointment is necessary for laboratory work as the orders have already been placed. Lab opens at 6:30 am and closes at 4:30 pm.         Calcium-Score Screening Heart Scan    A calcium-score screening heart test (coronary calcium scan) uses computerized tomography (CT) to detect calcium deposits in the coronary arteries of your heart. A higher coronary calcium-score suggests you have a higher chance of significant narrowing in the coronary arteries and a higher risk of future heart attack. Who should get a calcium-score screening? You should consider a calcium scan if you are between ages 44-73 and at increased risk for heart disease but do not have symptoms. People at increased risk include those with the following traits:  Family history of heart disease  Past or present smoker  History of high cholesterol, diabetes or high blood pressure  Overweight  Inactive lifestyle  Other non-traditional risk factors    If you are less than 36years old and high cholesterol runs in your family (familial hypercholesterolemia), you might consider a calcium scan. Note: Because there are certain forms of coronary disease -- such as \"soft plaque\" atherosclerosis - that escape detection during this CT scan, it is important to remember that this test is not absolute in predicting your risk for a life-threatening event, such as a heart attack    Who should not get a calcium scan? If you have a diagnosis of coronary artery disease, symptoms that are suspicious of coronary artery disease or prior treatment for coronary artery disease, coronary calcium scan may not be for you.  Talk with your healthcare provider about

## 2023-08-14 ENCOUNTER — TELEPHONE (OUTPATIENT)
Dept: GASTROENTEROLOGY | Age: 63
End: 2023-08-14

## 2023-08-14 NOTE — TELEPHONE ENCOUNTER
Pt called needing to reschedule CLN with Dr. Cookie Eason on 09/05/23, pt will be out of town. Pt left message on procedure  line. Please contact when able, thank you.

## 2023-09-12 ENCOUNTER — APPOINTMENT (OUTPATIENT)
Dept: OPERATING ROOM | Age: 63
End: 2023-09-12
Attending: INTERNAL MEDICINE

## 2023-09-12 ENCOUNTER — HOSPITAL ENCOUNTER (OUTPATIENT)
Age: 63
Setting detail: SPECIMEN
Discharge: HOME | End: 2023-09-12
Payer: OTHER GOVERNMENT

## 2023-09-12 ENCOUNTER — ANESTHESIA EVENT (OUTPATIENT)
Dept: OPERATING ROOM | Age: 63
End: 2023-09-12

## 2023-09-12 ENCOUNTER — HOSPITAL ENCOUNTER (OUTPATIENT)
Age: 63
Setting detail: OUTPATIENT SURGERY
Discharge: HOME | End: 2023-09-12
Attending: INTERNAL MEDICINE | Admitting: INTERNAL MEDICINE
Payer: OTHER GOVERNMENT

## 2023-09-12 ENCOUNTER — ANESTHESIA (OUTPATIENT)
Dept: OPERATING ROOM | Age: 63
End: 2023-09-12

## 2023-09-12 VITALS
HEART RATE: 68 BPM | TEMPERATURE: 97.5 F | WEIGHT: 195 LBS | OXYGEN SATURATION: 92 % | BODY MASS INDEX: 27.92 KG/M2 | DIASTOLIC BLOOD PRESSURE: 69 MMHG | HEIGHT: 70 IN | SYSTOLIC BLOOD PRESSURE: 114 MMHG | RESPIRATION RATE: 18 BRPM

## 2023-09-12 PROCEDURE — 45385 COLONOSCOPY W/LESION REMOVAL: CPT

## 2023-09-12 PROCEDURE — 88305 TISSUE EXAM BY PATHOLOGIST: CPT

## 2023-09-12 PROCEDURE — 45385 COLONOSCOPY W/LESION REMOVAL: CPT | Performed by: INTERNAL MEDICINE

## 2023-09-12 RX ORDER — LIDOCAINE HYDROCHLORIDE 10 MG/ML
INJECTION, SOLUTION INFILTRATION; PERINEURAL PRN
Status: DISCONTINUED | OUTPATIENT
Start: 2023-09-12 | End: 2023-09-12 | Stop reason: SDUPTHER

## 2023-09-12 RX ORDER — PROPOFOL 10 MG/ML
INJECTION, EMULSION INTRAVENOUS PRN
Status: DISCONTINUED | OUTPATIENT
Start: 2023-09-12 | End: 2023-09-12 | Stop reason: SDUPTHER

## 2023-09-12 RX ORDER — ACETAMINOPHEN 325 MG/1
650 TABLET ORAL EVERY 6 HOURS PRN
COMMUNITY

## 2023-09-12 RX ORDER — SODIUM CHLORIDE, SODIUM LACTATE, POTASSIUM CHLORIDE, CALCIUM CHLORIDE 600; 310; 30; 20 MG/100ML; MG/100ML; MG/100ML; MG/100ML
INJECTION, SOLUTION INTRAVENOUS CONTINUOUS
Status: DISCONTINUED | OUTPATIENT
Start: 2023-09-12 | End: 2023-09-12 | Stop reason: HOSPADM

## 2023-09-12 RX ADMIN — PROPOFOL 350 MG: 10 INJECTION, EMULSION INTRAVENOUS at 14:22

## 2023-09-12 RX ADMIN — LIDOCAINE HYDROCHLORIDE 50 MG: 10 INJECTION, SOLUTION INFILTRATION; PERINEURAL at 14:22

## 2023-09-12 RX ADMIN — SODIUM CHLORIDE, SODIUM LACTATE, POTASSIUM CHLORIDE, CALCIUM CHLORIDE: 600; 310; 30; 20 INJECTION, SOLUTION INTRAVENOUS at 11:50

## 2023-09-12 ASSESSMENT — PAIN - FUNCTIONAL ASSESSMENT: PAIN_FUNCTIONAL_ASSESSMENT: NONE - DENIES PAIN

## 2023-09-12 NOTE — H&P
Patient Name: Rocky Jeans  : 1960  MRN: [id number]  DATE: 23    Allergies: Allergies   Allergen Reactions    Hydrocodone Nausea Only        ENDOSCOPY  History and Physical    Procedure:    [] Diagnostic Colonoscopy       [x] Screening Colonoscopy  [] EGD      [] ERCP      [] EUS       [] Other    [x] Previous office notes/History and Physical reviewed from the patients chart. Please see EMR for further details of HPI. I have examined the patient's status immediately prior to the procedure and:      Indications/HPI:    []Abdominal Pain   []Cancer- GI/Lung     []Fhx of colon CA/polyps  []History of Polyps  []Barretts            []Melena  []Abnormal Imaging              []Dysphagia              []Persistent Pneumonia   []Anemia                            []Food Impaction        [x]History of Polyps  [] GI Bleed             []Pulmonary nodule/Mass   []Change in bowel habits []Heartburn/Reflux  []Rectal Bleed (BRBPR)  []Chest Pain - Non Cardiac []Heme (+) Stool []Ulcers  []Constipation  []Hemoptysis  []Varices  []Diarrhea  []Hypoxemia    []Nausea/Vomiting   [x]Screening   []Crohns/Colitis  []Other:     Anesthesia:   [x] MAC [] Moderate Sedation   [] General   [] None     ROS: 12 pt Review of Symptoms was negative unless mentioned above    Medications:   Prior to Admission medications    Medication Sig Start Date End Date Taking?  Authorizing Provider   acetaminophen (TYLENOL) 325 MG tablet Take 2 tablets by mouth every 6 hours as needed for Pain   Yes Historical Provider, MD   mometasone (NASONEX) 50 MCG/ACT nasal spray 2 sprays by Nasal route daily 22   Raisa Nino MD   sildenafil (VIAGRA) 100 MG tablet Take 1 tablet by mouth as needed for Erectile Dysfunction 21   Raisa Nino MD   aspirin-acetaminophen-caffeine (EXCEDRIN MIGRAINE) 096-072-66 MG per tablet Excedrin Extra Strength    Historical Provider, MD   vitamin B-12 (CYANOCOBALAMIN) 1000 MCG tablet Take 1 tablet by

## 2023-09-12 NOTE — OP NOTE
the cecum. The cecum was identified by the ileocecal valve and the appendiceal orifice. The colonoscope was then slowly withdrawn with careful inspection of the mucosa in a linear and circumferential fashion. The scope was retroflexed in the rectum. Suction was utilized during the procedure to remove as much air as possible from the bowel. The colonoscope was removed from the patient, and the procedure was terminated. Findings are listed below. Findings:     A 10 mm in diameter sessile hepatic flexure polyp, an 8 mm in diameter sessile descending colon polyp and 2 sessile rectal polyps between 5 to 6 mm in diameter were all removed by hot snare polypectomy. NO larger polyps or masses or strictures or colitis. Suboptimal exam due to prep quality as described above. Moderate diverticulosis in the left colon  Internal hemorrhoids-Grade 1 without any bleeding stigmata  Where it was clearly visible, the mucosa appeared normal throughout the entire examined colon  Retroflexion in the rectum was otherwise normal and revealed no further abnormalities      Recommendations:  1. Repeat colonoscopy: pending pathology -in 3 years based on colonoscopy findings today, prep quality and his past history; sooner if his personal or family history as pertaining to colorectal cancer risk changes requiring an earlier exam or if the patient were to develop lower GI symptoms such as bleeding, abdominal pain, change in bowel habits or stool caliber or if the patient has anemia or unexplained weight loss in the future. 2. Await biopsy results-you will receive a letter with your results within 7-10 days.    - Resume previous meds and diet  - GI clinic f/u PRN   - Keep scheduled f/u appts with other MDs     - NO ASA/NSAIDs x 2 weeks     Findings and recommendations were discussed w/ the patient. A copy of the images was provided.     (Please note that portions of this note were completed with a voice recognition program. Efforts were made to edit the dictations but occasionally words may be mis-transcribed.)     Smith Tejada MD, MD  9/12/2023  2:25 PM

## 2023-09-12 NOTE — ANESTHESIA POSTPROCEDURE EVALUATION
Department of Anesthesiology  Postprocedure Note    Patient: Vidhya Townsend  MRN: [id number]  YOB: 1960  Date of evaluation: 9/12/2023      Procedure Summary     Date: 09/12/23 Room / Location: [facility] ENDO 02 / [address]    Anesthesia Start: 1419 Anesthesia Stop: 4829    Procedures:       COLONOSCOPY POLYPECTOMY SNARE/COLD BIOPSY (Abdomen)      COLONOSCOPY POLYPECTOMY ABLATION (Abdomen) Diagnosis:       Hx of colonic polyps      Screen for colon cancer      (Hx of colonic polyps [Z86.010])      (Screen for colon cancer [Z12.11])    Surgeons: Laura Schilling MD Responsible Provider: JAYJAY Maradiaga CRNA    Anesthesia Type: TIVA ASA Status: 1          Anesthesia Type: No value filed.     Elias Phase I:      Elias Phase II:        Anesthesia Post Evaluation    Patient location during evaluation: bedside  Patient participation: complete - patient participated  Level of consciousness: sleepy but conscious  Pain score: 0  Airway patency: patent  Nausea & Vomiting: no nausea and no vomiting  Complications: no  Cardiovascular status: hemodynamically stable  Respiratory status: acceptable  Hydration status: stable  Pain management: adequate

## 2023-09-12 NOTE — DISCHARGE INSTRUCTIONS
1. Repeat colonoscopy: pending pathology -in 3 years based on colonoscopy findings today, prep quality and his past history; sooner if his personal or family history as pertaining to colorectal cancer risk changes requiring an earlier exam or if the patient were to develop lower GI symptoms such as bleeding, abdominal pain, change in bowel habits or stool caliber or if the patient has anemia or unexplained weight loss in the future. 2. Await biopsy results-you will receive a letter with your results within 7-10 days.    - Resume previous meds and diet  - GI clinic f/u PRN   - Keep scheduled f/u appts with other MDs     - NO ASA/NSAIDs x 2 weeks       NSAIDS Non-steroidal Anti-Inflammatory  You have been directed by your physician to avoid any NSAID's; the following medications are a list of those to avoid. If you think that you are taking any NSAID's notify your physician.    Over The Counter  Advil                      Motrin  Nuprin                   Ibuprofen  Midol                     Aleve  Naproxen              Orudis  Aspirin                   [place]  Prescriptions and Generics  Cataflam              Relafen  Voltaren               Clinoril  Indocin                 Naproxen  Arthrotec              Lodine  Daypro                 Nalfon  Toradol                Ansaid  Feldene               Meclofenamate  Fenoprofen          Ponstel  Mobic                   Celebrex  Vioxx

## 2024-07-08 ENCOUNTER — OFFICE VISIT (OUTPATIENT)
Dept: PRIMARY CARE CLINIC | Age: 64
End: 2024-07-08
Payer: OTHER GOVERNMENT

## 2024-07-08 VITALS
SYSTOLIC BLOOD PRESSURE: 132 MMHG | HEART RATE: 57 BPM | BODY MASS INDEX: 27.98 KG/M2 | WEIGHT: 195 LBS | TEMPERATURE: 97.4 F | OXYGEN SATURATION: 95 % | DIASTOLIC BLOOD PRESSURE: 86 MMHG

## 2024-07-08 DIAGNOSIS — K42.9 UMBILICAL HERNIA WITHOUT OBSTRUCTION AND WITHOUT GANGRENE: ICD-10-CM

## 2024-07-08 DIAGNOSIS — Z00.00 ANNUAL PHYSICAL EXAM: Primary | ICD-10-CM

## 2024-07-08 PROCEDURE — 99396 PREV VISIT EST AGE 40-64: CPT | Performed by: FAMILY MEDICINE

## 2024-07-08 RX ORDER — TADALAFIL 20 MG/1
20 TABLET ORAL DAILY PRN
Qty: 10 TABLET | Refills: 11 | Status: SHIPPED | OUTPATIENT
Start: 2024-07-08 | End: 2024-07-10 | Stop reason: SDUPTHER

## 2024-07-08 ASSESSMENT — PATIENT HEALTH QUESTIONNAIRE - PHQ9
1. LITTLE INTEREST OR PLEASURE IN DOING THINGS: NOT AT ALL
SUM OF ALL RESPONSES TO PHQ9 QUESTIONS 1 & 2: 0
SUM OF ALL RESPONSES TO PHQ QUESTIONS 1-9: 0
SUM OF ALL RESPONSES TO PHQ QUESTIONS 1-9: 0
2. FEELING DOWN, DEPRESSED OR HOPELESS: NOT AT ALL
SUM OF ALL RESPONSES TO PHQ QUESTIONS 1-9: 0
SUM OF ALL RESPONSES TO PHQ QUESTIONS 1-9: 0

## 2024-07-08 NOTE — PATIENT INSTRUCTIONS
How to improve constipation:  Increase water intake.  Try to drink 6-8 glasses of water a day.  Increase fiber intake with fruits and vegetables.  May also try an over the counter fiber supplement such as benefiber, fibersure, metamucil, citrucil, or fibercon.  Try stool softeners and laxatives:  Plan A:  Senna: 2 tablets twice a day PLUS  Colace 100mg twice a day        Plan B:  Stop Senna  Start miralax 17 g (1 capful) once a day PLUS  Colace 100mg twice a day        Plan C:  If no bowel movement in 4-5 days with the above may try either fleets enema or 1 bottle of magnesium citrate.    If still no relief, contact physician.      _______________________________________________________________    What Everyone Should Know about Shingles Vaccine (Shingrix)  CONTACT YOUR INSURANCE TO SEE IF YOUR POLICY COVERS THIS VACCINE  One of the Recommended Vaccines  Shingles vaccination is the only way to protect against shingles and postherpetic neuralgia (PHN), the most common complication from shingles. CDC recommends that healthy adults 50 years and older get two doses of the shingles vaccine called Shingrix®,  by 2 to 6 months, to prevent shingles and the complications from the disease. Your doctor or pharmacist can give you Shingrix as a shot in your upper arm.  Shingrix provides strong protection against shingles and PHN. Two doses of Shingrix is more than 90% effective at preventing shingles and PHN. Protection stays above 85% for at least the first four years after you get vaccinated. Shingrix is the preferred vaccine, over Zostavax®, a shingles vaccine in use since 2006.  Who Should Get Shingrix?  Healthy adults 50 years and older should get two doses of Shingrix,  by 2 to 6 months. You should get Shingrix even if in the past you  had shingles  received Zostavax  are not sure if you had chickenpox  Vaccine for Those 50 Years and Older  Shingrix reduces the risk of shingles and PHN by more than 90% in

## 2024-07-08 NOTE — PROGRESS NOTES
of shingles.  In adults 50 to 69 years old who got two doses, Shingrix was 97% effective in preventing shingles; among adults 70 years and older, Shingrix was 91% effective.  In adults 50 to 69 years old who got two doses, Shingrix was 91% effective in preventing PHN; among adults 70 years and older, Shingrix was 89% effective.  Shingrix protection remained high (more than 85%) in people 70 years and older throughout the four years following vaccination. Since your risk of shingles and PHN increases as you get older, it is important to have strong protection against shingles in your older years.    What are the possible side effects of Shingrix?  Studies show that Shingrix is safe. The vaccine helps your body create a strong defense against shingles. As a result, you are likely to have temporary side effects from getting the shots. The side effects may affect your ability to do normal daily activities for 2 to 3 days.  Most people got a sore arm with mild or moderate pain after getting Shingrix, and some also had redness and swelling where they got the shot. Some people felt tired, had muscle pain, a headache, shivering, fever, stomach pain, or nausea. About 1 out of 6 people who got Shingrix experienced side effects that prevented them from doing regular activities.  Symptoms went away on their own in about 2 to 3 days. Side effects were more common in younger people.  You might have a reaction to the first or second dose of Shingrix, or both doses.  If you experience side effects, you may choose to take over-the-counter pain medicine such as ibuprofen or acetaminophen.  Severe allergic reactions to any vaccine are very rare. Signs of a severe allergic reaction can include hives, swelling of the face and throat, difficulty breathing, a fast heartbeat, dizziness, and weakness. These would start a few minutes to a few hours after the vaccination. If you have a severe allergic reaction or other emergency that can’t

## 2024-07-10 NOTE — TELEPHONE ENCOUNTER
Madan Morrison called to request a refill on his medication. Patient called the pharmacy, they did not receive the script for Cialis 20 mg. The script was printed.       Last office visit : 7/8/2024   Next office visit : Visit date not found     Requested Prescriptions     Pending Prescriptions Disp Refills    tadalafil (CIALIS) 20 MG tablet 10 tablet 11     Sig: Take 1 tablet by mouth daily as needed for Erectile Dysfunction            Manny Rush MA

## 2024-07-11 RX ORDER — TADALAFIL 20 MG/1
20 TABLET ORAL DAILY PRN
Qty: 10 TABLET | Refills: 11 | Status: SHIPPED | OUTPATIENT
Start: 2024-07-11

## 2024-07-24 DIAGNOSIS — E78.00 HYPERCHOLESTEROLEMIA: ICD-10-CM

## 2024-07-24 DIAGNOSIS — R53.83 OTHER FATIGUE: ICD-10-CM

## 2024-07-24 DIAGNOSIS — Z12.5 ENCOUNTER FOR PROSTATE CANCER SCREENING: ICD-10-CM

## 2024-07-24 LAB
ALBUMIN SERPL-MCNC: 4.1 G/DL (ref 3.5–5.2)
ALP SERPL-CCNC: 49 U/L (ref 40–130)
ALT SERPL-CCNC: 37 U/L (ref 5–41)
ANION GAP SERPL CALCULATED.3IONS-SCNC: 11 MMOL/L (ref 7–19)
AST SERPL-CCNC: 24 U/L (ref 5–40)
BASOPHILS # BLD: 0 K/UL (ref 0–0.2)
BASOPHILS NFR BLD: 0.4 % (ref 0–1)
BILIRUB SERPL-MCNC: 0.4 MG/DL (ref 0.2–1.2)
BUN SERPL-MCNC: 19 MG/DL (ref 8–23)
CALCIUM SERPL-MCNC: 9 MG/DL (ref 8.8–10.2)
CHLORIDE SERPL-SCNC: 100 MMOL/L (ref 98–111)
CHOLEST SERPL-MCNC: 225 MG/DL (ref 160–199)
CO2 SERPL-SCNC: 28 MMOL/L (ref 22–29)
CREAT SERPL-MCNC: 1.1 MG/DL (ref 0.5–1.2)
EOSINOPHIL # BLD: 0 K/UL (ref 0–0.6)
EOSINOPHIL NFR BLD: 0.6 % (ref 0–5)
ERYTHROCYTE [DISTWIDTH] IN BLOOD BY AUTOMATED COUNT: 12.3 % (ref 11.5–14.5)
GLUCOSE SERPL-MCNC: 101 MG/DL (ref 74–109)
HCT VFR BLD AUTO: 47.9 % (ref 42–52)
HDLC SERPL-MCNC: 33 MG/DL (ref 55–121)
HGB BLD-MCNC: 15.3 G/DL (ref 14–18)
IMM GRANULOCYTES # BLD: 0 K/UL
LDLC SERPL CALC-MCNC: 162 MG/DL
LYMPHOCYTES # BLD: 3 K/UL (ref 1.1–4.5)
LYMPHOCYTES NFR BLD: 43.6 % (ref 20–40)
MCH RBC QN AUTO: 29.3 PG (ref 27–31)
MCHC RBC AUTO-ENTMCNC: 31.9 G/DL (ref 33–37)
MCV RBC AUTO: 91.6 FL (ref 80–94)
MONOCYTES # BLD: 0.6 K/UL (ref 0–0.9)
MONOCYTES NFR BLD: 8.3 % (ref 0–10)
NEUTROPHILS # BLD: 3.3 K/UL (ref 1.5–7.5)
NEUTS SEG NFR BLD: 46.8 % (ref 50–65)
PLATELET # BLD AUTO: 205 K/UL (ref 130–400)
PMV BLD AUTO: 10.8 FL (ref 9.4–12.4)
POTASSIUM SERPL-SCNC: 4.2 MMOL/L (ref 3.5–5)
PROT SERPL-MCNC: 7.1 G/DL (ref 6.6–8.7)
PSA SERPL-MCNC: 0.46 NG/ML (ref 0–4)
RBC # BLD AUTO: 5.23 M/UL (ref 4.7–6.1)
SODIUM SERPL-SCNC: 139 MMOL/L (ref 136–145)
TRIGL SERPL-MCNC: 150 MG/DL (ref 0–149)
WBC # BLD AUTO: 7 K/UL (ref 4.8–10.8)

## 2024-08-14 ENCOUNTER — HOSPITAL ENCOUNTER (OUTPATIENT)
Dept: GENERAL RADIOLOGY | Age: 64
Discharge: HOME | End: 2024-08-14
Payer: OTHER GOVERNMENT

## 2024-08-14 ENCOUNTER — OFFICE VISIT (OUTPATIENT)
Dept: PRIMARY CARE CLINIC | Age: 64
End: 2024-08-14
Payer: OTHER GOVERNMENT

## 2024-08-14 VITALS
DIASTOLIC BLOOD PRESSURE: 62 MMHG | TEMPERATURE: 98.7 F | BODY MASS INDEX: 27.98 KG/M2 | OXYGEN SATURATION: 100 % | HEART RATE: 86 BPM | WEIGHT: 195 LBS | SYSTOLIC BLOOD PRESSURE: 118 MMHG

## 2024-08-14 DIAGNOSIS — M79.671 RIGHT FOOT PAIN: Primary | ICD-10-CM

## 2024-08-14 DIAGNOSIS — S99.921A INJURY OF RIGHT FOOT, INITIAL ENCOUNTER: ICD-10-CM

## 2024-08-14 DIAGNOSIS — M79.89 SWELLING OF RIGHT FOOT: ICD-10-CM

## 2024-08-14 DIAGNOSIS — M79.671 RIGHT FOOT PAIN: ICD-10-CM

## 2024-08-14 PROCEDURE — 99214 OFFICE O/P EST MOD 30 MIN: CPT | Performed by: NURSE PRACTITIONER

## 2024-08-14 PROCEDURE — 73630 X-RAY EXAM OF FOOT: CPT

## 2024-08-14 PROCEDURE — 96372 THER/PROPH/DIAG INJ SC/IM: CPT | Performed by: NURSE PRACTITIONER

## 2024-08-14 RX ORDER — KETOROLAC TROMETHAMINE 10 MG/1
TABLET, FILM COATED ORAL
Qty: 10 TABLET | Refills: 0 | Status: SHIPPED | OUTPATIENT
Start: 2024-08-15 | End: 2024-08-19

## 2024-08-14 RX ORDER — KETOROLAC TROMETHAMINE 30 MG/ML
60 INJECTION, SOLUTION INTRAMUSCULAR; INTRAVENOUS ONCE
Status: COMPLETED | OUTPATIENT
Start: 2024-08-14 | End: 2024-08-14

## 2024-08-14 RX ADMIN — KETOROLAC TROMETHAMINE 60 MG: 30 INJECTION, SOLUTION INTRAMUSCULAR; INTRAVENOUS at 10:07

## 2024-08-14 SDOH — ECONOMIC STABILITY: FOOD INSECURITY: WITHIN THE PAST 12 MONTHS, YOU WORRIED THAT YOUR FOOD WOULD RUN OUT BEFORE YOU GOT MONEY TO BUY MORE.: NEVER TRUE

## 2024-08-14 SDOH — ECONOMIC STABILITY: FOOD INSECURITY: WITHIN THE PAST 12 MONTHS, THE FOOD YOU BOUGHT JUST DIDN'T LAST AND YOU DIDN'T HAVE MONEY TO GET MORE.: NEVER TRUE

## 2024-08-14 SDOH — ECONOMIC STABILITY: INCOME INSECURITY: HOW HARD IS IT FOR YOU TO PAY FOR THE VERY BASICS LIKE FOOD, HOUSING, MEDICAL CARE, AND HEATING?: NOT HARD AT ALL

## 2024-08-14 ASSESSMENT — ENCOUNTER SYMPTOMS
GASTROINTESTINAL NEGATIVE: 1
RESPIRATORY NEGATIVE: 1
ALLERGIC/IMMUNOLOGIC NEGATIVE: 1
EYES NEGATIVE: 1

## 2024-08-14 NOTE — PROGRESS NOTES
for Opioid Use Documents Filed        No documents found                     Patient given educational materials -see patient instructions.  Discussed use, benefit, and side effects of prescribed medications.  All patient questions answered.  Pt voiced understanding. Reviewed health maintenance.  Instructed to continue currentmedications, diet and exercise.  Patient agreed with treatment plan. Follow up as directed.   MEDICATIONS:  Orders Placed This Encounter   Medications    ketorolac (TORADOL) injection 60 mg         ORDERS:  Orders Placed This Encounter   Procedures    XR FOOT RIGHT (MIN 3 VIEWS)       Follow-up:  Return for keep follow up with PCP.    PATIENT INSTRUCTIONS:  There are no Patient Instructions on file for this visit.  Electronically signed by JAYJAY Finnegan on 8/14/2024 at 10:31 AM    EMR Dragon/transcription disclaimer:  Much of thisencounter note is electronic transcription/translation of spoken language to printed texts.  The electronic translation of spoken language may be erroneous, or at times, nonsensical words or phrases may be inadvertentlytranscribed.  Although I have reviewed the note for such errors, some may still exist.

## 2025-06-26 ENCOUNTER — TELEPHONE (OUTPATIENT)
Dept: PRIMARY CARE CLINIC | Age: 65
End: 2025-06-26

## 2025-06-26 NOTE — TELEPHONE ENCOUNTER
Pt called stating he thinks he may have broken his elbow? Swollen from elbow to hand. I [facility].

## 2025-06-27 ENCOUNTER — OFFICE VISIT (OUTPATIENT)
Age: 65
End: 2025-06-27
Payer: MEDICARE

## 2025-06-27 VITALS — HEIGHT: 70 IN | BODY MASS INDEX: 27.77 KG/M2 | WEIGHT: 194 LBS

## 2025-06-27 DIAGNOSIS — M25.421 EFFUSION, RIGHT ELBOW: Primary | ICD-10-CM

## 2025-06-27 DIAGNOSIS — M25.521 RIGHT ELBOW PAIN: ICD-10-CM

## 2025-06-27 DIAGNOSIS — Z91.81 STATUS POST FALL: ICD-10-CM

## 2025-06-27 PROCEDURE — G8427 DOCREV CUR MEDS BY ELIG CLIN: HCPCS | Performed by: PHYSICIAN ASSISTANT

## 2025-06-27 PROCEDURE — 3017F COLORECTAL CA SCREEN DOC REV: CPT | Performed by: PHYSICIAN ASSISTANT

## 2025-06-27 PROCEDURE — 1123F ACP DISCUSS/DSCN MKR DOCD: CPT | Performed by: PHYSICIAN ASSISTANT

## 2025-06-27 PROCEDURE — G8419 CALC BMI OUT NRM PARAM NOF/U: HCPCS | Performed by: PHYSICIAN ASSISTANT

## 2025-06-27 PROCEDURE — 99203 OFFICE O/P NEW LOW 30 MIN: CPT | Performed by: PHYSICIAN ASSISTANT

## 2025-06-27 PROCEDURE — 1036F TOBACCO NON-USER: CPT | Performed by: PHYSICIAN ASSISTANT

## 2025-06-27 RX ORDER — METHYLPREDNISOLONE 4 MG/1
TABLET ORAL
Qty: 21 TABLET | Refills: 0 | Status: SHIPPED | OUTPATIENT
Start: 2025-06-27

## 2025-06-27 RX ORDER — CEPHALEXIN 500 MG/1
500 CAPSULE ORAL 2 TIMES DAILY
Qty: 14 CAPSULE | Refills: 0 | Status: SHIPPED | OUTPATIENT
Start: 2025-06-27 | End: 2025-07-04
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
## 2025-07-01 ASSESSMENT — ENCOUNTER SYMPTOMS: SHORTNESS OF BREATH: 0

## 2025-07-03 ENCOUNTER — HOSPITAL ENCOUNTER (OUTPATIENT)
Dept: MRI IMAGING | Age: 65
Discharge: HOME | End: 2025-07-03
Payer: MEDICARE

## 2025-07-03 DIAGNOSIS — M25.521 RIGHT ELBOW PAIN: ICD-10-CM

## 2025-07-03 PROCEDURE — 73221 MRI JOINT UPR EXTREM W/O DYE: CPT

## 2025-07-07 ASSESSMENT — PATIENT HEALTH QUESTIONNAIRE - PHQ9
SUM OF ALL RESPONSES TO PHQ QUESTIONS 1-9: 0
SUM OF ALL RESPONSES TO PHQ9 QUESTIONS 1 & 2: 0
SUM OF ALL RESPONSES TO PHQ QUESTIONS 1-9: 0
1. LITTLE INTEREST OR PLEASURE IN DOING THINGS: NOT AT ALL
2. FEELING DOWN, DEPRESSED OR HOPELESS: NOT AT ALL
SUM OF ALL RESPONSES TO PHQ QUESTIONS 1-9: 0
2. FEELING DOWN, DEPRESSED OR HOPELESS: NOT AT ALL
SUM OF ALL RESPONSES TO PHQ QUESTIONS 1-9: 0
2. FEELING DOWN, DEPRESSED OR HOPELESS: NOT AT ALL
SUM OF ALL RESPONSES TO PHQ QUESTIONS 1-9: 0
1. LITTLE INTEREST OR PLEASURE IN DOING THINGS: NOT AT ALL
1. LITTLE INTEREST OR PLEASURE IN DOING THINGS: NOT AT ALL
SUM OF ALL RESPONSES TO PHQ QUESTIONS 1-9: 0

## 2025-07-14 ENCOUNTER — OFFICE VISIT (OUTPATIENT)
Dept: PRIMARY CARE CLINIC | Age: 65
End: 2025-07-14
Payer: MEDICARE

## 2025-07-14 VITALS
HEART RATE: 65 BPM | SYSTOLIC BLOOD PRESSURE: 124 MMHG | HEIGHT: 70 IN | WEIGHT: 191 LBS | OXYGEN SATURATION: 97 % | DIASTOLIC BLOOD PRESSURE: 68 MMHG | TEMPERATURE: 97 F | BODY MASS INDEX: 27.35 KG/M2

## 2025-07-14 DIAGNOSIS — R07.9 CHEST PAIN, UNSPECIFIED TYPE: ICD-10-CM

## 2025-07-14 DIAGNOSIS — Z00.00 ANNUAL PHYSICAL EXAM: Primary | ICD-10-CM

## 2025-07-14 DIAGNOSIS — R53.83 OTHER FATIGUE: ICD-10-CM

## 2025-07-14 DIAGNOSIS — Z13.220 LIPID SCREENING: ICD-10-CM

## 2025-07-14 DIAGNOSIS — Z12.5 ENCOUNTER FOR PROSTATE CANCER SCREENING: ICD-10-CM

## 2025-07-14 PROCEDURE — 93000 ELECTROCARDIOGRAM COMPLETE: CPT | Performed by: FAMILY MEDICINE

## 2025-07-14 PROCEDURE — 99214 OFFICE O/P EST MOD 30 MIN: CPT | Performed by: FAMILY MEDICINE

## 2025-07-14 PROCEDURE — G0439 PPPS, SUBSEQ VISIT: HCPCS | Performed by: FAMILY MEDICINE

## 2025-07-14 PROCEDURE — 1036F TOBACCO NON-USER: CPT | Performed by: FAMILY MEDICINE

## 2025-07-14 PROCEDURE — 1123F ACP DISCUSS/DSCN MKR DOCD: CPT | Performed by: FAMILY MEDICINE

## 2025-07-14 PROCEDURE — G8419 CALC BMI OUT NRM PARAM NOF/U: HCPCS | Performed by: FAMILY MEDICINE

## 2025-07-14 PROCEDURE — 3017F COLORECTAL CA SCREEN DOC REV: CPT | Performed by: FAMILY MEDICINE

## 2025-07-14 PROCEDURE — G8427 DOCREV CUR MEDS BY ELIG CLIN: HCPCS | Performed by: FAMILY MEDICINE

## 2025-07-14 SDOH — ECONOMIC STABILITY: FOOD INSECURITY: WITHIN THE PAST 12 MONTHS, THE FOOD YOU BOUGHT JUST DIDN'T LAST AND YOU DIDN'T HAVE MONEY TO GET MORE.: NEVER TRUE

## 2025-07-14 SDOH — ECONOMIC STABILITY: FOOD INSECURITY: WITHIN THE PAST 12 MONTHS, YOU WORRIED THAT YOUR FOOD WOULD RUN OUT BEFORE YOU GOT MONEY TO BUY MORE.: NEVER TRUE

## 2025-07-14 ASSESSMENT — ENCOUNTER SYMPTOMS
CHEST TIGHTNESS: 1
COUGH: 0
NAUSEA: 0
SHORTNESS OF BREATH: 0
CONSTIPATION: 0
ANAL BLEEDING: 0
ABDOMINAL PAIN: 0
DIARRHEA: 0

## 2025-07-14 ASSESSMENT — LIFESTYLE VARIABLES
HOW MANY STANDARD DRINKS CONTAINING ALCOHOL DO YOU HAVE ON A TYPICAL DAY: PATIENT DOES NOT DRINK
HOW OFTEN DO YOU HAVE A DRINK CONTAINING ALCOHOL: NEVER

## 2025-07-14 NOTE — PROGRESS NOTES
[facility]  [address]  [address]  [place] [address]  Dept: [phone]  Dept Fax: [phone]  Loc: [phone]    Madan Robertson is a 64 y.o. male who presents today for his medical conditions/complaints as noted below.  Madan Robertson is here for Medicare AWV and Elbow Injury (Right side no results of MRI yet)        Subjective:   CC:  Here today to discuss the following:    He has been seen in the orthopedic clinic for right elbow pain.  -States he had injured his right elbow while mowing grass  - -MRI results below  Overall he feels like he is improving.    He has been complaining of some left-sided chest discomfort.  Notices it while going on his mile walk.  No associated shortness of breath or palpitations.  Pain has been present for the past month or so.  No lightheadedness or dizziness.  No chest pain today.      Erectile Dysfunction  Satisfied with current mediation. No adverse effects.            Review of Systems   Constitutional:  Negative for chills and fever.   HENT:  Negative for congestion.    Respiratory:  Positive for chest tightness. Negative for cough and shortness of breath.    Cardiovascular:  Negative for chest pain, palpitations and leg swelling.   Gastrointestinal:  Negative for abdominal pain, anal bleeding, constipation, diarrhea and nausea.   Genitourinary:  Negative for difficulty urinating.   Psychiatric/Behavioral: Negative.       Objective:   /68   Pulse 65   Temp 97 °F (36.1 °C)   Ht 1.778 m (5' 10\")   Wt 86.6 kg (191 lb)   SpO2 97%   BMI 27.41 kg/m²   BP Readings from Last 3 Encounters:   07/14/25 124/68   08/14/24 118/62   07/08/24 132/86    Wt Readings from Last 3 Encounters:   07/14/25 86.6 kg (191 lb)   06/27/25 88 kg (194 lb)   08/14/24 88.5 kg (195 lb)         Physical Exam   Constitutional: He appears well. Does not appear ill.   Neck: Neck supple. No masses.  Neck Symmetric.  Normal tracheal position.

## 2025-07-14 NOTE — PROGRESS NOTES
Vision Screening    Right eye Left eye Both eyes   Without correction 20/20 20/25 20/25   With correction

## 2025-07-14 NOTE — PATIENT INSTRUCTIONS
Examine your lifestyle and the barriers to bad and good habits and how you can design your life to make better choices      Make it EASY to do the RIGHT THINGS.    1)  You can choose to Get 150 min/week of moderate exercise (can talk but can't sing) or 75 min/week of vigorous exercise (can't talk)   Examples: Walking, treadmill, bicycling, attending a gym.    2)  You can choose to Get 7-9 hours of sleep per night    Allow enough time each night to get adequate sleep.  Keep regular evening hours, same to bed and getting up every day.    3)  You can choose to Strength Train 2 x a week on non-consecutive days   Bodyweight exercises such push ups, sit ups, pilates or yoga   Purchase resistance bands to perform exercises   Utilize phone apps such as: [employer] or Rare Pink   Contact your local gym for a     4)  You can choose good nutrition.    Only eat your goal weight (in lbs) x 10 calories/day (Goal weight 150 lbs x 10 = 1500 calories per day)  Get 5 servings of Vegetables/day   Choose to eat a healthy diet such as the Mediterranean diet.    Plant based diets reduce risk of heart attack/stroke and will help you feel full on less food.    Avoid highly processed foods and processed carbohydrates.   Utilize apps to track your food:  Examples: Loseit, Myfitnesspal, or CalorieCounter by [employer]   Choose to follow a program.   Examples: Weight Watchers, Annmarie Mcallister, WholeYudi or Nutrisystem   Utilize apps identify and improve eating habits:   Examples: Eat Right Now, Noom    5)  You can choose to drink less alcohol: Drink less than 1-2 drinks/day no more than 7 drinks per week.    Alcohol will disrupt your sleep and add calories to your day    6)  You can choose to Practice Mindfulness and work on stress reduction.    Utilize apps such as Calm, Headspace, Abide  Contact local behavioral health specialists      Small changes every day will add up            Advance Care Planning: Care

## 2025-07-14 NOTE — PROGRESS NOTES
Welcome to Medicare  Vision Screening    Right eye Left eye Both eyes   Without correction 20/20 20/25 20/25   With correction              Name: Madan Robertson Today’s Date: 2025   MRN: [id number] Sex: Male   Age: 64 y.o. Ethnicity: Non- / Non    : 1960 Race: White (non-)        CareTeam (Including outside providers/suppliers regularly involved in providing care):   Patient Care Team:  Steve Lim MD as PCP - General (Family Medicine)  [facility] Lim [facility]      Madan Robertson is here for   Chief Complaint   Patient presents with    Medicare AWV    Elbow Injury     Right side no results of MRI yet        Screenings for behavioral, psychosocial and functional/safety risks, and cognitive dysfunction are all negative except as indicated below. These results, as well as other patient data from the Health Risk Assessment form, are documented in Flowsheets linked to this Encounter.    Allergies   Allergen Reactions    Hydrocodone Nausea Only       Prior to Visit Medications    Medication Sig Taking? Authorizing Provider   Thiamine HCl (VITAMIN B-1 PO) Take by mouth Yes Jv Nolan MD   Cyanocobalamin (B-12 PO) Take by mouth Yes Jv Nolan MD   Aspirin-Acetaminophen-Caffeine (EXCEDRIN PO) Take by mouth Yes Jv Nolan MD   tadalafil (CIALIS) 20 MG tablet Take 1 tablet by mouth daily as needed for Erectile Dysfunction Yes Steve Lim MD   acetaminophen (TYLENOL) 325 MG tablet Take 2 tablets by mouth every 6 hours as needed for Pain Yes Jv Nolan MD   vitamin B-12 (CYANOCOBALAMIN) 1000 MCG tablet Take 1 tablet by mouth daily Yes Jv Nolan MD   Cholecalciferol (VITAMIN D3) 25 MCG TABS Take by mouth Yes Jv Nolan MD   magnesium (MAGNESIUM-OXIDE) 250 MG TABS tablet Take 1 tablet by mouth daily Yes Jv Nolan MD   Ginkgo Biloba 40 MG TABS Take by mouth Yes An

## 2025-07-25 ENCOUNTER — HOSPITAL ENCOUNTER (OUTPATIENT)
Age: 65
Discharge: HOME | End: 2025-07-27
Attending: FAMILY MEDICINE
Payer: MEDICARE

## 2025-07-25 VITALS
SYSTOLIC BLOOD PRESSURE: 133 MMHG | WEIGHT: 190 LBS | DIASTOLIC BLOOD PRESSURE: 77 MMHG | BODY MASS INDEX: 27.2 KG/M2 | HEIGHT: 70 IN

## 2025-07-25 DIAGNOSIS — R07.9 CHEST PAIN, UNSPECIFIED TYPE: ICD-10-CM

## 2025-07-25 PROCEDURE — 93016 CV STRESS TEST SUPVJ ONLY: CPT | Performed by: INTERNAL MEDICINE

## 2025-07-25 PROCEDURE — 93350 STRESS TTE ONLY: CPT | Performed by: INTERNAL MEDICINE

## 2025-07-25 PROCEDURE — 93018 CV STRESS TEST I&R ONLY: CPT | Performed by: INTERNAL MEDICINE

## 2025-07-25 PROCEDURE — 6360000004 HC RX CONTRAST MEDICATION: Performed by: FAMILY MEDICINE

## 2025-07-25 PROCEDURE — 93017 CV STRESS TEST TRACING ONLY: CPT

## 2025-07-25 RX ADMIN — SULFUR HEXAFLUORIDE 2 ML: 60.7; .19; .19 INJECTION, POWDER, LYOPHILIZED, FOR SUSPENSION INTRAVENOUS; INTRAVESICAL at 09:15

## 2025-07-26 LAB
ECHO BSA: 2.06 M2
STRESS ANGINA INDEX: 2
STRESS BASELINE DIAS BP: 77 MMHG
STRESS BASELINE HR: 56 BPM
STRESS BASELINE SYS BP: 133 MMHG
STRESS ESTIMATED WORKLOAD: 9.7 METS
STRESS EXERCISE DUR MIN: 6 MIN
STRESS EXERCISE DUR SEC: 55 SEC
STRESS O2 SAT PEAK: 98 %
STRESS O2 SAT REST: 97 %
STRESS PEAK DIAS BP: 68 MMHG
STRESS PEAK SYS BP: 168 MMHG
STRESS PERCENT HR ACHIEVED: 75 %
STRESS POST PEAK HR: 117 BPM
STRESS RATE PRESSURE PRODUCT: NORMAL BPM*MMHG
STRESS RECOVERY ST ELEVATION: 1 MM
STRESS SR DUKE TREADMILL SCORE: -11
STRESS ST ELEVATION: 2 MM
STRESS STAGE 1 BP: NORMAL MMHG
STRESS STAGE 1 DURATION: 3 MIN:SEC
STRESS STAGE 1 HR: 91 BPM
STRESS STAGE 2 BP: NORMAL MMHG
STRESS STAGE 2 DURATION: 3 MIN:SEC
STRESS STAGE 2 HR: 107 BPM
STRESS STAGE 3 COMMENTS: NORMAL
STRESS STAGE 3 DURATION: NORMAL MIN:SEC
STRESS STAGE 3 HR: 114 BPM
STRESS STAGE RECOVERY 1 BP: NORMAL MMHG
STRESS STAGE RECOVERY 1 DURATION: NORMAL MIN:SEC
STRESS STAGE RECOVERY 1 HR: 57 BPM
STRESS TARGET HR: 155 BPM

## (undated) DEVICE — SPONGE ENDOSCP CLN BS INF PREVENTION KOALA

## (undated) DEVICE — ADAPTER CLEANING PORPOISE CLEANING

## (undated) DEVICE — SINGLE PORT MANIFOLD: Brand: NEPTUNE 2

## (undated) DEVICE — SNARE POLYP SM W13MMXL240CM SHTH DIA2.4MM OVL FLX DISP

## (undated) DEVICE — PATIENT RETURN ELECTRODE, SINGLE-USE, CONTACT QUALITY MONITORING, ADULT, WITH 9FT CORD, FOR PATIENTS WEIGING OVER 33LBS. (15KG): Brand: MEGADYNE

## (undated) DEVICE — ENDO KIT,LOURDES HOSPITAL: Brand: MEDLINE INDUSTRIES, INC.

## (undated) DEVICE — BRUSH ENDOSCP 2 END CHN HEDGEHOG

## (undated) DEVICE — CANNULA NSL AD L7FT DIV O2 CO2 W/ M LUERLOCK TRMPT CONN